# Patient Record
Sex: MALE | Race: WHITE | NOT HISPANIC OR LATINO | Employment: UNEMPLOYED | URBAN - NONMETROPOLITAN AREA
[De-identification: names, ages, dates, MRNs, and addresses within clinical notes are randomized per-mention and may not be internally consistent; named-entity substitution may affect disease eponyms.]

---

## 2020-01-24 ENCOUNTER — OFFICE VISIT (OUTPATIENT)
Dept: FAMILY MEDICINE CLINIC | Facility: CLINIC | Age: 60
End: 2020-01-24

## 2020-01-24 VITALS
HEART RATE: 113 BPM | BODY MASS INDEX: 29.49 KG/M2 | OXYGEN SATURATION: 95 % | TEMPERATURE: 98.7 F | HEIGHT: 70 IN | SYSTOLIC BLOOD PRESSURE: 130 MMHG | WEIGHT: 206 LBS | DIASTOLIC BLOOD PRESSURE: 78 MMHG

## 2020-01-24 DIAGNOSIS — Z72.0 TOBACCO USE: ICD-10-CM

## 2020-01-24 DIAGNOSIS — J44.9 CHRONIC OBSTRUCTIVE PULMONARY DISEASE, UNSPECIFIED COPD TYPE (HCC): Primary | ICD-10-CM

## 2020-01-24 DIAGNOSIS — R05.9 COUGH: ICD-10-CM

## 2020-01-24 PROCEDURE — 99203 OFFICE O/P NEW LOW 30 MIN: CPT | Performed by: NURSE PRACTITIONER

## 2020-01-24 RX ORDER — PREDNISONE 10 MG/1
10 TABLET ORAL DAILY
COMMUNITY
Start: 2020-01-15 | End: 2020-02-06 | Stop reason: HOSPADM

## 2020-01-24 RX ORDER — BUPROPION HYDROCHLORIDE 150 MG/1
150 TABLET, EXTENDED RELEASE ORAL 2 TIMES DAILY
Qty: 60 TABLET | Refills: 5 | Status: SHIPPED | OUTPATIENT
Start: 2020-01-24 | End: 2020-07-21

## 2020-01-24 RX ORDER — BENZONATATE 200 MG/1
200 CAPSULE ORAL 3 TIMES DAILY PRN
Qty: 60 CAPSULE | Refills: 1 | Status: SHIPPED | OUTPATIENT
Start: 2020-01-24 | End: 2020-04-28

## 2020-01-24 RX ORDER — LEVALBUTEROL TARTRATE 45 UG/1
AEROSOL, METERED ORAL
COMMUNITY
Start: 2020-01-15 | End: 2020-02-04

## 2020-01-24 RX ORDER — IPRATROPIUM BROMIDE AND ALBUTEROL SULFATE 2.5; .5 MG/3ML; MG/3ML
3 SOLUTION RESPIRATORY (INHALATION) 4 TIMES DAILY PRN
COMMUNITY
Start: 2020-01-15

## 2020-01-24 RX ORDER — MONTELUKAST SODIUM 10 MG/1
10 TABLET ORAL NIGHTLY
COMMUNITY
Start: 2020-01-15

## 2020-01-24 RX ORDER — OMALIZUMAB 202.5 MG/1.4ML
150 INJECTION, SOLUTION SUBCUTANEOUS
COMMUNITY
Start: 2020-01-31 | End: 2020-02-13

## 2020-01-24 NOTE — PROGRESS NOTES
Subjective   Vincenzo Fagan is a 59 y.o. male.     Chief Complaint: Establish Care and Cough    COPD   He complains of cough and shortness of breath. This is a chronic problem. The current episode started more than 1 year ago. The problem occurs constantly. The problem has been unchanged. Associated symptoms comments: Pt currently following with rubio Valente, in [place].. His symptoms are aggravated by exposure to smoke. Relieved by: Breo, Incruse, Singulair, Xopenex, DuoNeb. He reports moderate improvement on treatment. Risk factors for lung disease include smoking/tobacco exposure. His past medical history is significant for COPD.   Nicotine Dependence   Presents for initial visit. Symptoms include cravings. Preferred tobacco types include cigarettes. Preferred cigarette types include filtered. Preferred strength is regular. His urge triggers include company of smokers. His first smoke is from 8 to 10 AM. He smokes 1 pack of cigarettes per day. He started smoking when he was 15-17 years old. Treatments tried: chantix. Compliance with prior treatments has been good. There is no history of alcohol abuse and drug use.      Pt is here today to establish care.  Patient has not followed with a PCP in several years.  He has only been following with pulmonology.   Pt has a hx of COPD.  He is a current smoker.     Family History   Problem Relation Age of Onset   • COPD Mother    • Diabetes Mother    • Lung cancer Father        Social History     Socioeconomic History   • Marital status:      Spouse name: Not on file   • Number of children: Not on file   • Years of education: Not on file   • Highest education level: Not on file   Tobacco Use   • Smoking status: Current Some Day Smoker     Types: Cigarettes   • Smokeless tobacco: Never Used   Substance and Sexual Activity   • Alcohol use: Yes     Alcohol/week: 2.0 standard drinks     Types: 2 Cans of beer per week     Comment: Social/ with a meal   "  • Drug use: Never   • Sexual activity: Defer       Past Medical History:   Diagnosis Date   • Asthma    • COPD (chronic obstructive pulmonary disease) (CMS/[facility])    • Multiple allergies        Review of Systems   Constitutional: Negative.    HENT: Negative.    Respiratory: Positive for cough and shortness of breath.    Cardiovascular: Negative.    Gastrointestinal: Negative.    Musculoskeletal: Negative.    Skin: Negative.    Neurological: Negative.    Psychiatric/Behavioral: Negative.        Objective   Physical Exam   Constitutional: He is oriented to person, place, and time. He appears well-developed and well-nourished.   Neck: Normal range of motion. Neck supple.   Cardiovascular: Normal rate, regular rhythm and normal heart sounds.   Pulmonary/Chest: Effort normal. He has wheezes.   Neurological: He is alert and oriented to person, place, and time.   Skin: Skin is warm and dry.   Psychiatric: He has a normal mood and affect. His behavior is normal. Judgment and thought content normal.   Nursing note and vitals reviewed.      Procedures    Vitals: Blood pressure 130/78, pulse 113, temperature 98.7 °F (37.1 °C), temperature source Oral, height 177.8 cm (70\"), weight 93.4 kg (206 lb), SpO2 95 %.    Allergies: No Known Allergies     During this visit the following were done:  Labs Reviewed []    Labs Ordered []    Radiology Reports Reviewed []    Radiology Ordered []    PCP Records Reviewed []    Referring Provider Records Reviewed []    ER Records Reviewed []    Hospital Records Reviewed []    History Obtained From Family []    Radiology Images Reviewed []    Other Reviewed []    Records Requested []      Assessment/Plan   Vincenzo was seen today for establish care and cough.    Diagnoses and all orders for this visit:    Chronic obstructive pulmonary disease, unspecified COPD type (CMS/[facility])  -     CBC & Differential; Future  -     Comprehensive Metabolic Panel; Future  -     Lipid Panel; Future  -     Magnesium; " Future  -     TSH; Future  -     Vitamin B12; Future  -     T4, Free; Future  -     benzonatate (TESSALON) 200 MG capsule; Take 1 capsule by mouth 3 (Three) Times a Day As Needed for Cough.    Tobacco use  -     Start buPROPion SR (WELLBUTRIN SR) 150 MG 12 hr tablet; Take 1 tablet by mouth 2 (Two) Times a Day.  -     CBC & Differential; Future  -     Comprehensive Metabolic Panel; Future  -     Lipid Panel; Future  -     Magnesium; Future  -     TSH; Future  -     Vitamin B12; Future  -     T4, Free; Future    Cough  -     Start benzonatate (TESSALON) 200 MG capsule; Take 1 capsule by mouth 3 (Three) Times a Day As Needed for Cough.

## 2020-01-24 NOTE — PATIENT INSTRUCTIONS
For more information:    Quit Now [place]  1-800-QUIT-NOW  https://[place].quitlogix.org/en-[place]/  Steps to Quit Smoking  Smoking tobacco can be harmful to your health and can affect almost every organ in your body. Smoking puts you, and those around you, at risk for developing many serious chronic diseases. Quitting smoking is difficult, but it is one of the best things that you can do for your health. It is never too late to quit.  What are the benefits of quitting smoking?  When you quit smoking, you lower your risk of developing serious diseases and conditions, such as:  · Lung cancer or lung disease, such as COPD.  · Heart disease.  · Stroke.  · Heart attack.  · Infertility.  · Osteoporosis and bone fractures.  Additionally, symptoms such as coughing, wheezing, and shortness of breath may get better when you quit. You may also find that you get sick less often because your body is stronger at fighting off colds and infections. If you are pregnant, quitting smoking can help to reduce your chances of having a baby of low birth weight.  How do I get ready to quit?  When you decide to quit smoking, create a plan to make sure that you are successful. Before you quit:  · Pick a date to quit. Set a date within the next two weeks to give you time to prepare.  · Write down the reasons why you are quitting. Keep this list in places where you will see it often, such as on your bathroom mirror or in your car or wallet.  · Identify the people, places, things, and activities that make you want to smoke (triggers) and avoid them. Make sure to take these actions:  ¨ Throw away all cigarettes at home, at work, and in your car.  ¨ Throw away smoking accessories, such as ashtrays and lighters.  ¨ Clean your car and make sure to empty the ashtray.  ¨ Clean your home, including curtains and carpets.  · Tell your family, friends, and coworkers that you are quitting. Support from your loved ones can make quitting easier.  · Talk with  your health care provider about your options for quitting smoking.  · Find out what treatment options are covered by your health insurance.  What strategies can I use to quit smoking?  Talk with your healthcare provider about different strategies to quit smoking. Some strategies include:  · Quitting smoking altogether instead of gradually lessening how much you smoke over a period of time. Research shows that quitting “cold turkey” is more successful than gradually quitting.  · Attending in-person counseling to help you build problem-solving skills. You are more likely to have success in quitting if you attend several counseling sessions. Even short sessions of 10 minutes can be effective.  · Finding resources and support systems that can help you to quit smoking and remain smoke-free after you quit. These resources are most helpful when you use them often. They can include:  ¨ Online chats with a counselor.  ¨ Telephone quitlines.  ¨ Printed self-help materials.  ¨ Support groups or group counseling.  ¨ Text messaging programs.  ¨ Mobile phone applications.  · Taking medicines to help you quit smoking. (If you are pregnant or breastfeeding, talk with your health care provider first.) Some medicines contain nicotine and some do not. Both types of medicines help with cravings, but the medicines that include nicotine help to relieve withdrawal symptoms. Your health care provider may recommend:  ¨ Nicotine patches, gum, or lozenges.  ¨ Nicotine inhalers or sprays.  ¨ Non-nicotine medicine that is taken by mouth.  Talk with your health care provider about combining strategies, such as taking medicines while you are also receiving in-person counseling. Using these two strategies together makes you more likely to succeed in quitting than if you used either strategy on its own.  If you are pregnant or breastfeeding, talk with your health care provider about finding counseling or other support strategies to quit smoking. Do  not take medicine to help you quit smoking unless told to do so by your health care provider.  What things can I do to make it easier to quit?  Quitting smoking might feel overwhelming at first, but there is a lot that you can do to make it easier. Take these important actions:  · Reach out to your family and friends and ask that they support and encourage you during this time. Call telephone quitlines, reach out to support groups, or work with a counselor for support.  · Ask people who smoke to avoid smoking around you.  · Avoid places that trigger you to smoke, such as bars, parties, or smoke-break areas at work.  · Spend time around people who do not smoke.  · Lessen stress in your life, because stress can be a smoking trigger for some people. To lessen stress, try:  ¨ Exercising regularly.  ¨ Deep-breathing exercises.  ¨ Yoga.  ¨ Meditating.  ¨ Performing a body scan. This involves closing your eyes, scanning your body from head to toe, and noticing which parts of your body are particularly tense. Purposefully relax the muscles in those areas.  · Download or purchase mobile phone or tablet apps (applications) that can help you stick to your quit plan by providing reminders, tips, and encouragement. There are many free apps, such as QuitGuide from the CDC (Centers for Disease Control and Prevention). You can find other support for quitting smoking (smoking cessation) through smokefree.gov and other websites.  How will I feel when I quit smoking?  Within the first 24 hours of quitting smoking, you may start to feel some withdrawal symptoms. These symptoms are usually most noticeable 2-3 days after quitting, but they usually do not last beyond 2-3 weeks. Changes or symptoms that you might experience include:  · Mood swings.  · Restlessness, anxiety, or irritation.  · Difficulty concentrating.  · Dizziness.  · Strong cravings for sugary foods in addition to nicotine.  · Mild weight  gain.  · Constipation.  · Nausea.  · Coughing or a sore throat.  · Changes in how your medicines work in your body.  · A depressed mood.  · Difficulty sleeping (insomnia).  After the first 2-3 weeks of quitting, you may start to notice more positive results, such as:  · Improved sense of smell and taste.  · Decreased coughing and sore throat.  · Slower heart rate.  · Lower blood pressure.  · Clearer skin.  · The ability to breathe more easily.  · Fewer sick days.  Quitting smoking is very challenging for most people. Do not get discouraged if you are not successful the first time. Some people need to make many attempts to quit before they achieve long-term success. Do your best to stick to your quit plan, and talk with your health care provider if you have any questions or concerns.  This information is not intended to replace advice given to you by your health care provider. Make sure you discuss any questions you have with your health care provider.  Document Released: 12/12/2002 Document Revised: 08/15/2017 Document Reviewed: 05/03/2016  [employer] Interactive Patient Education © 2017 [employer]      Chronic Obstructive Pulmonary Disease    Chronic obstructive pulmonary disease (COPD) is a long-term (chronic) condition that affects the lungs. COPD is a general term that can be used to describe many different lung problems that cause lung swelling (inflammation) and limit airflow, including chronic bronchitis and emphysema. If you have COPD, your lung function will probably never return to normal. In most cases, it gets worse over time. However, there are steps you can take to slow the progression of the disease and improve your quality of life.  What are the causes?  This condition may be caused by:  · Smoking. This is the most common cause.  · Certain genes passed down through families.  What increases the risk?  The following factors may make you more likely to develop this condition:  · Secondhand smoke from  cigarettes, pipes, or cigars.  · Exposure to chemicals and other irritants such as fumes and dust in the work environment.  · Chronic lung conditions or infections.  What are the signs or symptoms?  Symptoms of this condition include:  · Shortness of breath, especially during physical activity.  · Chronic cough with a large amount of thick mucus. Sometimes the cough may not have any mucus (dry cough).  · Wheezing.  · Rapid breaths.  · Gray or bluish discoloration (cyanosis) of the skin, especially in your fingers, toes, or lips.  · Feeling tired (fatigue).  · Weight loss.  · Chest tightness.  · Frequent infections.  · Episodes when breathing symptoms become much worse (exacerbations).  · Swelling in the ankles, feet, or legs. This may occur in later stages of the disease.  How is this diagnosed?  This condition is diagnosed based on:  · Your medical history.  · A physical exam.  You may also have tests, including:  · Lung (pulmonary) function tests. This may include a spirometry test, which measures your ability to exhale properly.  · Chest X-ray.  · CT scan.  · Blood tests.  How is this treated?  This condition may be treated with:  · Medicines. These may include inhaled rescue medicines to treat acute exacerbations as well as long-term, or maintenance, medicines to prevent flare-ups of COPD.  ? Bronchodilators help treat COPD by dilating the airways to allow increased airflow and make your breathing more comfortable.  ? Steroids can reduce airway inflammation and help prevent exacerbations.  · Smoking cessation. If you smoke, your health care provider may ask you to quit, and may also recommend therapy or replacement products to help you quit.  · Pulmonary rehabilitation. This may involve working with a team of health care providers and specialists, such as respiratory, occupational, and physical therapists.  · Exercise and physical activity. These are beneficial for nearly all people with COPD.  · Nutrition  therapy to gain weight, if you are underweight.  · Oxygen. Supplemental oxygen therapy is only helpful if you have a low oxygen level in your blood (hypoxemia).  · Lung surgery or transplant.  · Palliative care. This is to help people with COPD feel comfortable when treatment is no longer working.  Follow these instructions at home:  Medicines  · Take over-the-counter and prescription medicines (inhaled or pills) only as told by your health care provider.  · Talk to your health care provider before taking any cough or allergy medicines. You may need to avoid certain medicines that dry out your airways.  Lifestyle  · If you are a smoker, the most important thing that you can do is to stop smoking. Do not use any products that contain nicotine or tobacco, such as cigarettes and e-cigarettes. If you need help quitting, ask your health care provider. Continuing to smoke will cause the disease to progress faster.  · Avoid exposure to things that irritate your lungs, such as smoke, chemicals, and fumes.  · Stay active, but balance activity with periods of rest. Exercise and physical activity will help you maintain your ability to do things you want to do.  · Learn and use relaxation techniques to manage stress and to control your breathing.  · Get the right amount of sleep and get quality sleep. Most adults need 7 or more hours per night.  · Eat healthy foods. Eating smaller, more frequent meals and resting before meals may help you maintain your strength.  Controlled breathing  Learn and use controlled breathing techniques as directed by your health care provider. Controlled breathing techniques include:  · Pursed lip breathing. Start by breathing in (inhaling) through your nose for 1 second. Then, purse your lips as if you were going to whistle and breathe out (exhale) through the pursed lips for 2 seconds.  · Diaphragmatic breathing. Start by putting one hand on your abdomen just above your waist. Inhale slowly through  your nose. The hand on your abdomen should move out. Then purse your lips and exhale slowly. You should be able to feel the hand on your abdomen moving in as you exhale.  Controlled coughing  Learn and use controlled coughing to clear mucus from your lungs. Controlled coughing is a series of short, progressive coughs. The steps of controlled coughing are:  1. Lean your head slightly forward.  2. Breathe in deeply using diaphragmatic breathing.  3. Try to hold your breath for 3 seconds.  4. Keep your mouth slightly open while coughing twice.  5. Spit any mucus out into a tissue.  6. Rest and repeat the steps once or twice as needed.  General instructions  · Make sure you receive all the vaccines that your health care provider recommends, especially the pneumococcal and influenza vaccines. Preventing infection and hospitalization is very important when you have COPD.  · Use oxygen therapy and pulmonary rehabilitation if directed to by your health care provider. If you require home oxygen therapy, ask your health care provider whether you should purchase a pulse oximeter to measure your oxygen level at home.  · Work with your health care provider to develop a COPD action plan. This will help you know what steps to take if your condition gets worse.  · Keep other chronic health conditions under control as told by your health care provider.  · Avoid extreme temperature and humidity changes.  · Avoid contact with people who have an illness that spreads from person to person (is contagious), such as viral infections or pneumonia.  · Keep all follow-up visits as told by your health care provider. This is important.  Contact a health care provider if:  · You are coughing up more mucus than usual.  · There is a change in the color or thickness of your mucus.  · Your breathing is more labored than usual.  · Your breathing is faster than usual.  · You have difficulty sleeping.  · You need to use your rescue medicines or  inhalers more often than expected.  · You have trouble doing routine activities such as getting dressed or walking around the house.  Get help right away if:  · You have shortness of breath while you are resting.  · You have shortness of breath that prevents you from:  ? Being able to talk.  ? Performing your usual physical activities.  · You have chest pain lasting longer than 5 minutes.  · Your skin color is more blue (cyanotic) than usual.  · You measure low oxygen saturations for longer than 5 minutes with a pulse oximeter.  · You have a fever.  · You feel too tired to breathe normally.  Summary  · Chronic obstructive pulmonary disease (COPD) is a long-term (chronic) condition that affects the lungs.  · Your lung function will probably never return to normal. In most cases, it gets worse over time. However, there are steps you can take to slow the progression of the disease and improve your quality of life.  · Treatment for COPD may include taking medicines, quitting smoking, pulmonary rehabilitation, and changes to diet and exercise. As the disease progresses, you may need oxygen therapy, a lung transplant, or palliative care.  · To help manage your condition, do not smoke, avoid exposure to things that irritate your lungs, stay up to date on all vaccines, and follow your health care provider's instructions for taking medicines.  This information is not intended to replace advice given to you by your health care provider. Make sure you discuss any questions you have with your health care provider.  Document Released: 09/27/2006 Document Revised: 06/13/2018 Document Reviewed: 01/22/2018  [employer] Interactive Patient Education © 2019 [employer]

## 2020-01-28 ENCOUNTER — LAB (OUTPATIENT)
Dept: FAMILY MEDICINE CLINIC | Facility: CLINIC | Age: 60
End: 2020-01-28

## 2020-01-28 DIAGNOSIS — Z72.0 TOBACCO USE: ICD-10-CM

## 2020-01-28 DIAGNOSIS — J44.9 CHRONIC OBSTRUCTIVE PULMONARY DISEASE, UNSPECIFIED COPD TYPE (HCC): ICD-10-CM

## 2020-01-28 PROCEDURE — 85025 COMPLETE CBC W/AUTO DIFF WBC: CPT | Performed by: NURSE PRACTITIONER

## 2020-01-28 PROCEDURE — 82607 VITAMIN B-12: CPT | Performed by: NURSE PRACTITIONER

## 2020-01-28 PROCEDURE — 84439 ASSAY OF FREE THYROXINE: CPT | Performed by: NURSE PRACTITIONER

## 2020-01-28 PROCEDURE — 80053 COMPREHEN METABOLIC PANEL: CPT | Performed by: NURSE PRACTITIONER

## 2020-01-28 PROCEDURE — 80061 LIPID PANEL: CPT | Performed by: NURSE PRACTITIONER

## 2020-01-28 PROCEDURE — 83735 ASSAY OF MAGNESIUM: CPT | Performed by: NURSE PRACTITIONER

## 2020-01-28 PROCEDURE — 84443 ASSAY THYROID STIM HORMONE: CPT | Performed by: NURSE PRACTITIONER

## 2020-01-29 LAB
ALBUMIN SERPL-MCNC: 4.3 G/DL (ref 3.5–5.2)
ALBUMIN/GLOB SERPL: 1.7 G/DL
ALP SERPL-CCNC: 63 U/L (ref 39–117)
ALT SERPL W P-5'-P-CCNC: 23 U/L (ref 1–41)
ANION GAP SERPL CALCULATED.3IONS-SCNC: 15.1 MMOL/L (ref 5–15)
AST SERPL-CCNC: 15 U/L (ref 1–40)
BASOPHILS # BLD AUTO: 0.02 10*3/MM3 (ref 0–0.2)
BASOPHILS NFR BLD AUTO: 0.2 % (ref 0–1.5)
BILIRUB SERPL-MCNC: 0.3 MG/DL (ref 0.2–1.2)
BUN BLD-MCNC: 10 MG/DL (ref 6–20)
BUN/CREAT SERPL: 11.2 (ref 7–25)
CALCIUM SPEC-SCNC: 9.1 MG/DL (ref 8.6–10.5)
CHLORIDE SERPL-SCNC: 97 MMOL/L (ref 98–107)
CHOLEST SERPL-MCNC: 187 MG/DL (ref 0–200)
CO2 SERPL-SCNC: 23.9 MMOL/L (ref 22–29)
CREAT BLD-MCNC: 0.89 MG/DL (ref 0.76–1.27)
DEPRECATED RDW RBC AUTO: 39.9 FL (ref 37–54)
EOSINOPHIL # BLD AUTO: 0.1 10*3/MM3 (ref 0–0.4)
EOSINOPHIL NFR BLD AUTO: 1 % (ref 0.3–6.2)
ERYTHROCYTE [DISTWIDTH] IN BLOOD BY AUTOMATED COUNT: 12.4 % (ref 12.3–15.4)
GFR SERPL CREATININE-BSD FRML MDRD: 88 ML/MIN/1.73
GLOBULIN UR ELPH-MCNC: 2.5 GM/DL
GLUCOSE BLD-MCNC: 90 MG/DL (ref 65–99)
HCT VFR BLD AUTO: 47 % (ref 37.5–51)
HDLC SERPL-MCNC: 48 MG/DL (ref 40–60)
HGB BLD-MCNC: 15.3 G/DL (ref 13–17.7)
IMM GRANULOCYTES # BLD AUTO: 0.04 10*3/MM3 (ref 0–0.05)
IMM GRANULOCYTES NFR BLD AUTO: 0.4 % (ref 0–0.5)
LDLC SERPL CALC-MCNC: 121 MG/DL (ref 0–100)
LDLC/HDLC SERPL: 2.53 {RATIO}
LYMPHOCYTES # BLD AUTO: 1.57 10*3/MM3 (ref 0.7–3.1)
LYMPHOCYTES NFR BLD AUTO: 15.7 % (ref 19.6–45.3)
MAGNESIUM SERPL-MCNC: 2.2 MG/DL (ref 1.6–2.6)
MCH RBC QN AUTO: 28.8 PG (ref 26.6–33)
MCHC RBC AUTO-ENTMCNC: 32.6 G/DL (ref 31.5–35.7)
MCV RBC AUTO: 88.3 FL (ref 79–97)
MONOCYTES # BLD AUTO: 1 10*3/MM3 (ref 0.1–0.9)
MONOCYTES NFR BLD AUTO: 10 % (ref 5–12)
NEUTROPHILS # BLD AUTO: 7.29 10*3/MM3 (ref 1.7–7)
NEUTROPHILS NFR BLD AUTO: 72.7 % (ref 42.7–76)
NRBC BLD AUTO-RTO: 0 /100 WBC (ref 0–0.2)
PLATELET # BLD AUTO: 274 10*3/MM3 (ref 140–450)
PMV BLD AUTO: 10.9 FL (ref 6–12)
POTASSIUM BLD-SCNC: 4.1 MMOL/L (ref 3.5–5.2)
PROT SERPL-MCNC: 6.8 G/DL (ref 6–8.5)
RBC # BLD AUTO: 5.32 10*6/MM3 (ref 4.14–5.8)
SODIUM BLD-SCNC: 136 MMOL/L (ref 136–145)
T4 FREE SERPL-MCNC: 0.99 NG/DL (ref 0.93–1.7)
TRIGL SERPL-MCNC: 89 MG/DL (ref 0–150)
TSH SERPL DL<=0.05 MIU/L-ACNC: 0.73 UIU/ML (ref 0.27–4.2)
VIT B12 BLD-MCNC: 355 PG/ML (ref 211–946)
VLDLC SERPL-MCNC: 17.8 MG/DL (ref 5–40)
WBC NRBC COR # BLD: 10.02 10*3/MM3 (ref 3.4–10.8)

## 2020-01-30 RX ORDER — ATORVASTATIN CALCIUM 20 MG/1
20 TABLET, FILM COATED ORAL NIGHTLY
Qty: 90 TABLET | Refills: 1 | Status: SHIPPED | OUTPATIENT
Start: 2020-01-30 | End: 2020-10-13

## 2020-02-03 ENCOUNTER — APPOINTMENT (OUTPATIENT)
Dept: CT IMAGING | Facility: HOSPITAL | Age: 60
End: 2020-02-03

## 2020-02-03 ENCOUNTER — APPOINTMENT (OUTPATIENT)
Dept: GENERAL RADIOLOGY | Facility: HOSPITAL | Age: 60
End: 2020-02-03

## 2020-02-03 ENCOUNTER — HOSPITAL ENCOUNTER (INPATIENT)
Facility: HOSPITAL | Age: 60
LOS: 3 days | Discharge: HOME | End: 2020-02-06
Attending: FAMILY MEDICINE | Admitting: HOSPITALIST

## 2020-02-03 ENCOUNTER — DOCUMENTATION (OUTPATIENT)
Dept: FAMILY MEDICINE CLINIC | Facility: CLINIC | Age: 60
End: 2020-02-03

## 2020-02-03 DIAGNOSIS — J10.1 INFLUENZA A: Primary | ICD-10-CM

## 2020-02-03 DIAGNOSIS — R09.02 HYPOXIA: ICD-10-CM

## 2020-02-03 PROBLEM — J96.01 ACUTE RESPIRATORY FAILURE WITH HYPOXIA (HCC): Status: ACTIVE | Noted: 2020-02-03

## 2020-02-03 LAB
A-A DO2: 32.8 MMHG (ref 0–300)
A-A DO2: 59.9 MMHG (ref 0–300)
ALBUMIN SERPL-MCNC: 4.34 G/DL (ref 3.5–5.2)
ALBUMIN/GLOB SERPL: 1.2 G/DL
ALP SERPL-CCNC: 77 U/L (ref 39–117)
ALT SERPL W P-5'-P-CCNC: 20 U/L (ref 1–41)
ANION GAP SERPL CALCULATED.3IONS-SCNC: 14.8 MMOL/L (ref 5–15)
APTT PPP: 32.3 SECONDS (ref 23.8–36.1)
ARTERIAL PATENCY WRIST A: POSITIVE
ARTERIAL PATENCY WRIST A: POSITIVE
AST SERPL-CCNC: 16 U/L (ref 1–40)
ATMOSPHERIC PRESS: 724 MMHG
ATMOSPHERIC PRESS: 725 MMHG
BASE EXCESS BLDA CALC-SCNC: 0 MMOL/L (ref 0–2)
BASE EXCESS BLDA CALC-SCNC: 1.4 MMOL/L (ref 0–2)
BASOPHILS # BLD AUTO: 0.02 10*3/MM3 (ref 0–0.2)
BASOPHILS NFR BLD AUTO: 0.2 % (ref 0–1.5)
BDY SITE: ABNORMAL
BDY SITE: ABNORMAL
BILIRUB SERPL-MCNC: 0.3 MG/DL (ref 0.2–1.2)
BODY TEMPERATURE: 0 C
BODY TEMPERATURE: 0 C
BUN BLD-MCNC: 11 MG/DL (ref 6–20)
BUN/CREAT SERPL: 9.6 (ref 7–25)
CALCIUM SPEC-SCNC: 9.7 MG/DL (ref 8.6–10.5)
CHLORIDE SERPL-SCNC: 98 MMOL/L (ref 98–107)
CO2 BLDA-SCNC: 27.4 MMOL/L (ref 22–33)
CO2 BLDA-SCNC: 27.5 MMOL/L (ref 22–33)
CO2 SERPL-SCNC: 23.2 MMOL/L (ref 22–29)
COHGB MFR BLD: 0.9 % (ref 0–5)
COHGB MFR BLD: 0.9 % (ref 0–5)
CREAT BLD-MCNC: 1.15 MG/DL (ref 0.76–1.27)
CRP SERPL-MCNC: 1.55 MG/DL (ref 0–0.5)
D-LACTATE SERPL-SCNC: 2.5 MMOL/L (ref 0.5–2)
DEPRECATED RDW RBC AUTO: 43.1 FL (ref 37–54)
EOSINOPHIL # BLD AUTO: 0 10*3/MM3 (ref 0–0.4)
EOSINOPHIL NFR BLD AUTO: 0 % (ref 0.3–6.2)
ERYTHROCYTE [DISTWIDTH] IN BLOOD BY AUTOMATED COUNT: 12.8 % (ref 12.3–15.4)
FLUAV AG NPH QL: POSITIVE
FLUBV AG NPH QL IA: NEGATIVE
GAS FLOW AIRWAY: 1 LPM
GFR SERPL CREATININE-BSD FRML MDRD: 65 ML/MIN/1.73
GLOBULIN UR ELPH-MCNC: 3.6 GM/DL
GLUCOSE BLD-MCNC: 131 MG/DL (ref 65–99)
HCO3 BLDA-SCNC: 26 MMOL/L (ref 20–26)
HCO3 BLDA-SCNC: 26.3 MMOL/L (ref 20–26)
HCT VFR BLD AUTO: 49.1 % (ref 37.5–51)
HCT VFR BLD CALC: 45.9 % (ref 38–51)
HCT VFR BLD CALC: 48.5 % (ref 38–51)
HGB BLD-MCNC: 15.5 G/DL (ref 13–17.7)
HGB BLDA-MCNC: 15 G/DL (ref 14–18)
HGB BLDA-MCNC: 15.8 G/DL (ref 14–18)
HOLD SPECIMEN: NORMAL
HOROWITZ INDEX BLD+IHG-RTO: 21 %
HOROWITZ INDEX BLD+IHG-RTO: 24 %
IMM GRANULOCYTES # BLD AUTO: 0.07 10*3/MM3 (ref 0–0.05)
IMM GRANULOCYTES NFR BLD AUTO: 0.6 % (ref 0–0.5)
INR PPP: 0.94 (ref 0.9–1.1)
LYMPHOCYTES # BLD AUTO: 0.56 10*3/MM3 (ref 0.7–3.1)
LYMPHOCYTES NFR BLD AUTO: 4.4 % (ref 19.6–45.3)
Lab: ABNORMAL
Lab: ABNORMAL
MAGNESIUM SERPL-MCNC: 2 MG/DL (ref 1.6–2.6)
MCH RBC QN AUTO: 28.9 PG (ref 26.6–33)
MCHC RBC AUTO-ENTMCNC: 31.6 G/DL (ref 31.5–35.7)
MCV RBC AUTO: 91.4 FL (ref 79–97)
METHGB BLD QL: 0.4 % (ref 0–3)
METHGB BLD QL: 0.4 % (ref 0–3)
MODALITY: ABNORMAL
MODALITY: ABNORMAL
MONOCYTES # BLD AUTO: 1.09 10*3/MM3 (ref 0.1–0.9)
MONOCYTES NFR BLD AUTO: 8.6 % (ref 5–12)
NEUTROPHILS # BLD AUTO: 10.87 10*3/MM3 (ref 1.7–7)
NEUTROPHILS NFR BLD AUTO: 86.2 % (ref 42.7–76)
NOTE: ABNORMAL
NOTE: ABNORMAL
NRBC BLD AUTO-RTO: 0 /100 WBC (ref 0–0.2)
NT-PROBNP SERPL-MCNC: 119.3 PG/ML (ref 5–900)
OXYHGB MFR BLDV: 87.8 % (ref 94–99)
OXYHGB MFR BLDV: 88.7 % (ref 94–99)
PCO2 BLDA: 41.6 MM HG (ref 35–45)
PCO2 BLDA: 46.2 MM HG (ref 35–45)
PCO2 TEMP ADJ BLD: ABNORMAL MM[HG]
PCO2 TEMP ADJ BLD: ABNORMAL MM[HG]
PH BLDA: 7.36 PH UNITS (ref 7.35–7.45)
PH BLDA: 7.41 PH UNITS (ref 7.35–7.45)
PH, TEMP CORRECTED: ABNORMAL
PH, TEMP CORRECTED: ABNORMAL
PLATELET # BLD AUTO: 294 10*3/MM3 (ref 140–450)
PMV BLD AUTO: 9.9 FL (ref 6–12)
PO2 BLDA: 56 MM HG (ref 83–108)
PO2 BLDA: 57.4 MM HG (ref 83–108)
PO2 TEMP ADJ BLD: ABNORMAL MM[HG]
PO2 TEMP ADJ BLD: ABNORMAL MM[HG]
POTASSIUM BLD-SCNC: 4.3 MMOL/L (ref 3.5–5.2)
PROT SERPL-MCNC: 7.9 G/DL (ref 6–8.5)
PROTHROMBIN TIME: 13.1 SECONDS (ref 11–15.4)
RBC # BLD AUTO: 5.37 10*6/MM3 (ref 4.14–5.8)
SAO2 % BLDCOA: 89 % (ref 94–99)
SAO2 % BLDCOA: 89.9 % (ref 94–99)
SODIUM BLD-SCNC: 136 MMOL/L (ref 136–145)
TROPONIN T SERPL-MCNC: <0.01 NG/ML (ref 0–0.03)
VENTILATOR MODE: ABNORMAL
VENTILATOR MODE: ABNORMAL
WBC NRBC COR # BLD: 12.61 10*3/MM3 (ref 3.4–10.8)
WHOLE BLOOD HOLD SPECIMEN: NORMAL
WHOLE BLOOD HOLD SPECIMEN: NORMAL

## 2020-02-03 PROCEDURE — 99284 EMERGENCY DEPT VISIT MOD MDM: CPT

## 2020-02-03 PROCEDURE — 86140 C-REACTIVE PROTEIN: CPT | Performed by: PHYSICIAN ASSISTANT

## 2020-02-03 PROCEDURE — 85730 THROMBOPLASTIN TIME PARTIAL: CPT | Performed by: PHYSICIAN ASSISTANT

## 2020-02-03 PROCEDURE — 87804 INFLUENZA ASSAY W/OPTIC: CPT | Performed by: PHYSICIAN ASSISTANT

## 2020-02-03 PROCEDURE — 85025 COMPLETE CBC W/AUTO DIFF WBC: CPT | Performed by: FAMILY MEDICINE

## 2020-02-03 PROCEDURE — 0 IOPAMIDOL PER 1 ML: Performed by: FAMILY MEDICINE

## 2020-02-03 PROCEDURE — 82805 BLOOD GASES W/O2 SATURATION: CPT

## 2020-02-03 PROCEDURE — 93005 ELECTROCARDIOGRAM TRACING: CPT | Performed by: FAMILY MEDICINE

## 2020-02-03 PROCEDURE — 93010 ELECTROCARDIOGRAM REPORT: CPT | Performed by: INTERNAL MEDICINE

## 2020-02-03 PROCEDURE — 71275 CT ANGIOGRAPHY CHEST: CPT

## 2020-02-03 PROCEDURE — 83735 ASSAY OF MAGNESIUM: CPT | Performed by: PHYSICIAN ASSISTANT

## 2020-02-03 PROCEDURE — 36600 WITHDRAWAL OF ARTERIAL BLOOD: CPT

## 2020-02-03 PROCEDURE — 84484 ASSAY OF TROPONIN QUANT: CPT | Performed by: PHYSICIAN ASSISTANT

## 2020-02-03 PROCEDURE — 94799 UNLISTED PULMONARY SVC/PX: CPT

## 2020-02-03 PROCEDURE — 94640 AIRWAY INHALATION TREATMENT: CPT

## 2020-02-03 PROCEDURE — 80053 COMPREHEN METABOLIC PANEL: CPT | Performed by: FAMILY MEDICINE

## 2020-02-03 PROCEDURE — 87040 BLOOD CULTURE FOR BACTERIA: CPT | Performed by: FAMILY MEDICINE

## 2020-02-03 PROCEDURE — 71045 X-RAY EXAM CHEST 1 VIEW: CPT | Performed by: RADIOLOGY

## 2020-02-03 PROCEDURE — 83050 HGB METHEMOGLOBIN QUAN: CPT

## 2020-02-03 PROCEDURE — 71045 X-RAY EXAM CHEST 1 VIEW: CPT

## 2020-02-03 PROCEDURE — 83605 ASSAY OF LACTIC ACID: CPT | Performed by: FAMILY MEDICINE

## 2020-02-03 PROCEDURE — 83880 ASSAY OF NATRIURETIC PEPTIDE: CPT | Performed by: PHYSICIAN ASSISTANT

## 2020-02-03 PROCEDURE — 25010000002 METHYLPREDNISOLONE PER 125 MG: Performed by: PHYSICIAN ASSISTANT

## 2020-02-03 PROCEDURE — 82375 ASSAY CARBOXYHB QUANT: CPT

## 2020-02-03 PROCEDURE — 85610 PROTHROMBIN TIME: CPT | Performed by: PHYSICIAN ASSISTANT

## 2020-02-03 RX ORDER — LEVALBUTEROL INHALATION SOLUTION 1.25 MG/3ML
1.25 SOLUTION RESPIRATORY (INHALATION) EVERY 6 HOURS PRN
Status: DISCONTINUED | OUTPATIENT
Start: 2020-02-03 | End: 2020-02-06 | Stop reason: HOSPADM

## 2020-02-03 RX ORDER — ACETAMINOPHEN 325 MG/1
650 TABLET ORAL ONCE
Status: COMPLETED | OUTPATIENT
Start: 2020-02-03 | End: 2020-02-03

## 2020-02-03 RX ORDER — SODIUM CHLORIDE 0.9 % (FLUSH) 0.9 %
10 SYRINGE (ML) INJECTION AS NEEDED
Status: DISCONTINUED | OUTPATIENT
Start: 2020-02-03 | End: 2020-02-06 | Stop reason: HOSPADM

## 2020-02-03 RX ORDER — METHYLPREDNISOLONE SODIUM SUCCINATE 125 MG/2ML
125 INJECTION, POWDER, LYOPHILIZED, FOR SOLUTION INTRAMUSCULAR; INTRAVENOUS ONCE
Status: COMPLETED | OUTPATIENT
Start: 2020-02-03 | End: 2020-02-03

## 2020-02-03 RX ORDER — OSELTAMIVIR PHOSPHATE 75 MG/1
75 CAPSULE ORAL ONCE
Status: COMPLETED | OUTPATIENT
Start: 2020-02-03 | End: 2020-02-03

## 2020-02-03 RX ORDER — IPRATROPIUM BROMIDE AND ALBUTEROL SULFATE 2.5; .5 MG/3ML; MG/3ML
3 SOLUTION RESPIRATORY (INHALATION) ONCE
Status: COMPLETED | OUTPATIENT
Start: 2020-02-03 | End: 2020-02-03

## 2020-02-03 RX ADMIN — SODIUM CHLORIDE 1000 ML: 9 INJECTION, SOLUTION INTRAVENOUS at 21:43

## 2020-02-03 RX ADMIN — IOPAMIDOL 95 ML: 755 INJECTION, SOLUTION INTRAVENOUS at 22:31

## 2020-02-03 RX ADMIN — IPRATROPIUM BROMIDE 0.5 MG: 0.5 SOLUTION RESPIRATORY (INHALATION) at 23:46

## 2020-02-03 RX ADMIN — IPRATROPIUM BROMIDE AND ALBUTEROL SULFATE 3 ML: .5; 3 SOLUTION RESPIRATORY (INHALATION) at 19:19

## 2020-02-03 RX ADMIN — METHYLPREDNISOLONE SODIUM SUCCINATE 125 MG: 125 INJECTION, POWDER, FOR SOLUTION INTRAMUSCULAR; INTRAVENOUS at 19:41

## 2020-02-03 RX ADMIN — HYDROCODONE POLISTIREX AND CHLORPHENIRAMINE POLISTIREX 5 ML: 10; 8 SUSPENSION, EXTENDED RELEASE ORAL at 19:41

## 2020-02-03 RX ADMIN — SODIUM CHLORIDE 1000 ML: 9 INJECTION, SOLUTION INTRAVENOUS at 19:41

## 2020-02-03 RX ADMIN — SODIUM CHLORIDE 1000 ML: 9 INJECTION, SOLUTION INTRAVENOUS at 19:39

## 2020-02-03 RX ADMIN — OSELTAMIVIR PHOSPHATE 75 MG: 75 CAPSULE ORAL at 20:11

## 2020-02-03 RX ADMIN — ACETAMINOPHEN 650 MG: 325 TABLET ORAL at 21:43

## 2020-02-03 RX ADMIN — LEVALBUTEROL HYDROCHLORIDE 1.25 MG: 1.25 SOLUTION RESPIRATORY (INHALATION) at 20:25

## 2020-02-03 NOTE — PROGRESS NOTES
Patient's wife called call line - states that patient has been having a several day history of shortness of breath, coughing, malaise, fatigue, and fever. It is getting so bad now that he cannot talk. Discussed with patient to go to the ER. He will go - MA is giving report to the ER.

## 2020-02-04 LAB
ALBUMIN SERPL-MCNC: 3.8 G/DL (ref 3.5–5.2)
ALBUMIN/GLOB SERPL: 1.2 G/DL
ALP SERPL-CCNC: 67 U/L (ref 39–117)
ALT SERPL W P-5'-P-CCNC: 20 U/L (ref 1–41)
ANION GAP SERPL CALCULATED.3IONS-SCNC: 10.1 MMOL/L (ref 5–15)
AST SERPL-CCNC: 14 U/L (ref 1–40)
BASOPHILS # BLD AUTO: 0.01 10*3/MM3 (ref 0–0.2)
BASOPHILS NFR BLD AUTO: 0.1 % (ref 0–1.5)
BILIRUB SERPL-MCNC: 0.3 MG/DL (ref 0.2–1.2)
BILIRUB UR QL STRIP: NEGATIVE
BUN BLD-MCNC: 11 MG/DL (ref 6–20)
BUN/CREAT SERPL: 11.1 (ref 7–25)
CALCIUM SPEC-SCNC: 8.8 MG/DL (ref 8.6–10.5)
CHLORIDE SERPL-SCNC: 101 MMOL/L (ref 98–107)
CLARITY UR: CLEAR
CO2 SERPL-SCNC: 22.9 MMOL/L (ref 22–29)
COLOR UR: YELLOW
CREAT BLD-MCNC: 0.99 MG/DL (ref 0.76–1.27)
CRP SERPL-MCNC: 2.15 MG/DL (ref 0–0.5)
D-LACTATE SERPL-SCNC: 1.9 MMOL/L (ref 0.5–2)
DEPRECATED RDW RBC AUTO: 43.9 FL (ref 37–54)
EOSINOPHIL # BLD AUTO: 0 10*3/MM3 (ref 0–0.4)
EOSINOPHIL NFR BLD AUTO: 0 % (ref 0.3–6.2)
ERYTHROCYTE [DISTWIDTH] IN BLOOD BY AUTOMATED COUNT: 13 % (ref 12.3–15.4)
GFR SERPL CREATININE-BSD FRML MDRD: 78 ML/MIN/1.73
GLOBULIN UR ELPH-MCNC: 3.1 GM/DL
GLUCOSE BLD-MCNC: 143 MG/DL (ref 65–99)
GLUCOSE UR STRIP-MCNC: NEGATIVE MG/DL
HCT VFR BLD AUTO: 45.6 % (ref 37.5–51)
HGB BLD-MCNC: 14.2 G/DL (ref 13–17.7)
HGB UR QL STRIP.AUTO: NEGATIVE
IMM GRANULOCYTES # BLD AUTO: 0.05 10*3/MM3 (ref 0–0.05)
IMM GRANULOCYTES NFR BLD AUTO: 0.5 % (ref 0–0.5)
KETONES UR QL STRIP: NEGATIVE
LEUKOCYTE ESTERASE UR QL STRIP.AUTO: NEGATIVE
LYMPHOCYTES # BLD AUTO: 0.31 10*3/MM3 (ref 0.7–3.1)
LYMPHOCYTES NFR BLD AUTO: 3.1 % (ref 19.6–45.3)
MCH RBC QN AUTO: 28.7 PG (ref 26.6–33)
MCHC RBC AUTO-ENTMCNC: 31.1 G/DL (ref 31.5–35.7)
MCV RBC AUTO: 92.3 FL (ref 79–97)
MONOCYTES # BLD AUTO: 0.12 10*3/MM3 (ref 0.1–0.9)
MONOCYTES NFR BLD AUTO: 1.2 % (ref 5–12)
NEUTROPHILS # BLD AUTO: 9.66 10*3/MM3 (ref 1.7–7)
NEUTROPHILS NFR BLD AUTO: 95.1 % (ref 42.7–76)
NITRITE UR QL STRIP: NEGATIVE
NRBC BLD AUTO-RTO: 0 /100 WBC (ref 0–0.2)
PH UR STRIP.AUTO: 6 [PH] (ref 5–8)
PLATELET # BLD AUTO: 249 10*3/MM3 (ref 140–450)
PMV BLD AUTO: 10.1 FL (ref 6–12)
POTASSIUM BLD-SCNC: 4.2 MMOL/L (ref 3.5–5.2)
PROT SERPL-MCNC: 6.9 G/DL (ref 6–8.5)
PROT UR QL STRIP: NEGATIVE
RBC # BLD AUTO: 4.94 10*6/MM3 (ref 4.14–5.8)
SODIUM BLD-SCNC: 134 MMOL/L (ref 136–145)
SP GR UR STRIP: 1.02 (ref 1–1.03)
UROBILINOGEN UR QL STRIP: NORMAL
WBC NRBC COR # BLD: 10.15 10*3/MM3 (ref 3.4–10.8)

## 2020-02-04 PROCEDURE — 80053 COMPREHEN METABOLIC PANEL: CPT | Performed by: HOSPITALIST

## 2020-02-04 PROCEDURE — 86140 C-REACTIVE PROTEIN: CPT | Performed by: HOSPITALIST

## 2020-02-04 PROCEDURE — 85025 COMPLETE CBC W/AUTO DIFF WBC: CPT | Performed by: HOSPITALIST

## 2020-02-04 PROCEDURE — 81003 URINALYSIS AUTO W/O SCOPE: CPT | Performed by: HOSPITALIST

## 2020-02-04 PROCEDURE — 94799 UNLISTED PULMONARY SVC/PX: CPT

## 2020-02-04 PROCEDURE — 25010000002 HEPARIN (PORCINE) PER 1000 UNITS: Performed by: HOSPITALIST

## 2020-02-04 PROCEDURE — 94640 AIRWAY INHALATION TREATMENT: CPT

## 2020-02-04 PROCEDURE — 99223 1ST HOSP IP/OBS HIGH 75: CPT | Performed by: HOSPITALIST

## 2020-02-04 PROCEDURE — 83605 ASSAY OF LACTIC ACID: CPT | Performed by: FAMILY MEDICINE

## 2020-02-04 PROCEDURE — 25010000002 METHYLPREDNISOLONE PER 40 MG: Performed by: HOSPITALIST

## 2020-02-04 RX ORDER — BENZONATATE 100 MG/1
200 CAPSULE ORAL 3 TIMES DAILY PRN
Status: DISCONTINUED | OUTPATIENT
Start: 2020-02-04 | End: 2020-02-06 | Stop reason: HOSPADM

## 2020-02-04 RX ORDER — ALBUTEROL SULFATE 90 UG/1
2 AEROSOL, METERED RESPIRATORY (INHALATION) EVERY 4 HOURS PRN
COMMUNITY

## 2020-02-04 RX ORDER — ATORVASTATIN CALCIUM 20 MG/1
20 TABLET, FILM COATED ORAL NIGHTLY
Status: DISCONTINUED | OUTPATIENT
Start: 2020-02-04 | End: 2020-02-06 | Stop reason: HOSPADM

## 2020-02-04 RX ORDER — IPRATROPIUM BROMIDE AND ALBUTEROL SULFATE 2.5; .5 MG/3ML; MG/3ML
3 SOLUTION RESPIRATORY (INHALATION) 4 TIMES DAILY PRN
Status: DISCONTINUED | OUTPATIENT
Start: 2020-02-04 | End: 2020-02-06 | Stop reason: HOSPADM

## 2020-02-04 RX ORDER — BUDESONIDE AND FORMOTEROL FUMARATE DIHYDRATE 160; 4.5 UG/1; UG/1
2 AEROSOL RESPIRATORY (INHALATION)
Status: DISCONTINUED | OUTPATIENT
Start: 2020-02-04 | End: 2020-02-06 | Stop reason: HOSPADM

## 2020-02-04 RX ORDER — L.ACID,PARA/B.BIFIDUM/S.THERM 8B CELL
1 CAPSULE ORAL DAILY
Status: DISCONTINUED | OUTPATIENT
Start: 2020-02-04 | End: 2020-02-06 | Stop reason: HOSPADM

## 2020-02-04 RX ORDER — ALBUTEROL SULFATE 2.5 MG/3ML
2.5 SOLUTION RESPIRATORY (INHALATION) EVERY 4 HOURS PRN
Status: CANCELLED | OUTPATIENT
Start: 2020-02-04

## 2020-02-04 RX ORDER — SODIUM CHLORIDE 0.9 % (FLUSH) 0.9 %
10 SYRINGE (ML) INJECTION EVERY 12 HOURS SCHEDULED
Status: DISCONTINUED | OUTPATIENT
Start: 2020-02-04 | End: 2020-02-06 | Stop reason: HOSPADM

## 2020-02-04 RX ORDER — OSELTAMIVIR PHOSPHATE 75 MG/1
75 CAPSULE ORAL EVERY 12 HOURS SCHEDULED
Status: DISCONTINUED | OUTPATIENT
Start: 2020-02-04 | End: 2020-02-06 | Stop reason: HOSPADM

## 2020-02-04 RX ORDER — MONTELUKAST SODIUM 10 MG/1
10 TABLET ORAL NIGHTLY
Status: DISCONTINUED | OUTPATIENT
Start: 2020-02-04 | End: 2020-02-06 | Stop reason: HOSPADM

## 2020-02-04 RX ORDER — BUPROPION HYDROCHLORIDE 150 MG/1
150 TABLET, EXTENDED RELEASE ORAL 2 TIMES DAILY
Status: DISCONTINUED | OUTPATIENT
Start: 2020-02-04 | End: 2020-02-06 | Stop reason: HOSPADM

## 2020-02-04 RX ORDER — SODIUM CHLORIDE 9 MG/ML
100 INJECTION, SOLUTION INTRAVENOUS CONTINUOUS
Status: DISCONTINUED | OUTPATIENT
Start: 2020-02-04 | End: 2020-02-04

## 2020-02-04 RX ORDER — HEPARIN SODIUM 5000 [USP'U]/ML
5000 INJECTION, SOLUTION INTRAVENOUS; SUBCUTANEOUS EVERY 12 HOURS SCHEDULED
Status: DISCONTINUED | OUTPATIENT
Start: 2020-02-04 | End: 2020-02-06 | Stop reason: HOSPADM

## 2020-02-04 RX ORDER — SODIUM CHLORIDE 0.9 % (FLUSH) 0.9 %
10 SYRINGE (ML) INJECTION AS NEEDED
Status: DISCONTINUED | OUTPATIENT
Start: 2020-02-04 | End: 2020-02-06 | Stop reason: HOSPADM

## 2020-02-04 RX ORDER — METHYLPREDNISOLONE SODIUM SUCCINATE 40 MG/ML
40 INJECTION, POWDER, LYOPHILIZED, FOR SOLUTION INTRAMUSCULAR; INTRAVENOUS EVERY 12 HOURS
Status: DISCONTINUED | OUTPATIENT
Start: 2020-02-04 | End: 2020-02-06

## 2020-02-04 RX ORDER — ACETYLCYSTEINE 200 MG/ML
3 SOLUTION ORAL; RESPIRATORY (INHALATION)
Status: DISCONTINUED | OUTPATIENT
Start: 2020-02-04 | End: 2020-02-06 | Stop reason: HOSPADM

## 2020-02-04 RX ORDER — PREDNISONE 10 MG/1
10 TABLET ORAL DAILY
Status: CANCELLED | OUTPATIENT
Start: 2020-02-04

## 2020-02-04 RX ADMIN — Medication 1 CAPSULE: at 15:02

## 2020-02-04 RX ADMIN — METHYLPREDNISOLONE SODIUM SUCCINATE 40 MG: 40 INJECTION, POWDER, FOR SOLUTION INTRAMUSCULAR; INTRAVENOUS at 19:57

## 2020-02-04 RX ADMIN — OSELTAMIVIR PHOSPHATE 75 MG: 75 CAPSULE ORAL at 08:27

## 2020-02-04 RX ADMIN — HEPARIN SODIUM 5000 UNITS: 5000 INJECTION INTRAVENOUS; SUBCUTANEOUS at 08:26

## 2020-02-04 RX ADMIN — IPRATROPIUM BROMIDE 0.5 MG: 0.5 SOLUTION RESPIRATORY (INHALATION) at 07:13

## 2020-02-04 RX ADMIN — SODIUM CHLORIDE, PRESERVATIVE FREE 10 ML: 5 INJECTION INTRAVENOUS at 19:58

## 2020-02-04 RX ADMIN — IPRATROPIUM BROMIDE 0.5 MG: 0.5 SOLUTION RESPIRATORY (INHALATION) at 12:53

## 2020-02-04 RX ADMIN — SODIUM CHLORIDE 100 ML/HR: 9 INJECTION, SOLUTION INTRAVENOUS at 03:50

## 2020-02-04 RX ADMIN — SODIUM CHLORIDE, PRESERVATIVE FREE 10 ML: 5 INJECTION INTRAVENOUS at 08:23

## 2020-02-04 RX ADMIN — HEPARIN SODIUM 5000 UNITS: 5000 INJECTION INTRAVENOUS; SUBCUTANEOUS at 19:58

## 2020-02-04 RX ADMIN — SODIUM CHLORIDE, PRESERVATIVE FREE 10 ML: 5 INJECTION INTRAVENOUS at 03:11

## 2020-02-04 RX ADMIN — ATORVASTATIN CALCIUM 20 MG: 20 TABLET, FILM COATED ORAL at 19:56

## 2020-02-04 RX ADMIN — OSELTAMIVIR PHOSPHATE 75 MG: 75 CAPSULE ORAL at 19:56

## 2020-02-04 RX ADMIN — IPRATROPIUM BROMIDE 0.5 MG: 0.5 SOLUTION RESPIRATORY (INHALATION) at 04:12

## 2020-02-04 RX ADMIN — IPRATROPIUM BROMIDE AND ALBUTEROL SULFATE 3 ML: .5; 3 SOLUTION RESPIRATORY (INHALATION) at 19:38

## 2020-02-04 RX ADMIN — BUPROPION HYDROCHLORIDE 150 MG: 150 TABLET, FILM COATED, EXTENDED RELEASE ORAL at 19:57

## 2020-02-04 RX ADMIN — METHYLPREDNISOLONE SODIUM SUCCINATE 40 MG: 40 INJECTION, POWDER, FOR SOLUTION INTRAMUSCULAR; INTRAVENOUS at 08:23

## 2020-02-04 RX ADMIN — BUDESONIDE AND FORMOTEROL FUMARATE DIHYDRATE 2 PUFF: 160; 4.5 AEROSOL RESPIRATORY (INHALATION) at 18:39

## 2020-02-04 RX ADMIN — ACETYLCYSTEINE 3 ML: 200 SOLUTION ORAL; RESPIRATORY (INHALATION) at 19:38

## 2020-02-04 RX ADMIN — ACETYLCYSTEINE 3 ML: 200 SOLUTION ORAL; RESPIRATORY (INHALATION) at 12:53

## 2020-02-04 RX ADMIN — MONTELUKAST SODIUM 10 MG: 10 TABLET, COATED ORAL at 19:57

## 2020-02-04 RX ADMIN — BUPROPION HYDROCHLORIDE 150 MG: 150 TABLET, FILM COATED, EXTENDED RELEASE ORAL at 11:18

## 2020-02-04 NOTE — H&P
Hospitalist History and Physical        Patient Identification  Name: Vincenzo Fagan  Age/Sex: 59 y.o. male  :  1960        MRN: [id number]  Visit Number: [id number]  PCP: Laureen Bernard APRN          Chief complaint short of breath    History of Present Illness:  Patient is a 59 y.o. male with history of COPD/asthma who presents with worsening shortness of breath and cough of 3 days duration. He reports his cough is occasionally productive of clear sputum. He denies associated fever or chills. He reports some chest tightness associated with cough. He denies myalgias. He denies sick contacts. In the ED, he was found to be significantly tachycardic and tachypneic with hypoxia on room air. He tested positive for influenza. He has been admitted to the PCU for further management of COPD exacerbation, acute hypoxic respiratory failure, and severe sepsis secondary to influenza A infection.    Review of Systems  Review of Systems   Constitutional: Positive for activity change and fatigue. Negative for appetite change, chills, fever and unexpected weight change.   HENT: Negative for congestion, postnasal drip, rhinorrhea, sinus pressure, sinus pain and sore throat.    Eyes: Negative for photophobia, pain, discharge, redness, itching and visual disturbance.   Respiratory: Positive for cough, chest tightness, shortness of breath and wheezing.    Cardiovascular: Negative for chest pain, palpitations and leg swelling.   Gastrointestinal: Negative for abdominal distention, abdominal pain, constipation, diarrhea, nausea and vomiting.   Endocrine: Negative for cold intolerance, heat intolerance, polydipsia, polyphagia and polyuria.   Genitourinary: Negative for difficulty urinating, dysuria, flank pain, frequency and hematuria.   Musculoskeletal: Negative for arthralgias, back pain, joint swelling, myalgias, neck pain and neck stiffness.   Skin: Negative for color change, pallor, rash and wound.    Neurological: Negative for dizziness, tremors, seizures, syncope, facial asymmetry, speech difficulty, weakness, light-headedness, numbness and headaches.   Hematological: Negative for adenopathy. Does not bruise/bleed easily.   Psychiatric/Behavioral: Negative for agitation, behavioral problems and confusion.       History  Past Medical History:   Diagnosis Date   • Asthma    • COPD (chronic obstructive pulmonary disease) (CMS/[facility])    • Elevated cholesterol    • GERD (gastroesophageal reflux disease)    • Hyperlipidemia    • Multiple allergies      No past surgical history on file.  Family History   Problem Relation Age of Onset   • COPD Mother    • Diabetes Mother    • Hyperlipidemia Mother    • Lung cancer Father    • Cancer Father    • Lung cancer Sister      Social History     Tobacco Use   • Smoking status: Current Some Day Smoker     Packs/day: 0.50     Years: 0.50     Pack years: 0.25     Types: Cigarettes, Electronic Cigarette     Start date: 8/4/2019   • Smokeless tobacco: Never Used   Substance Use Topics   • Alcohol use: Never     Alcohol/week: 2.0 standard drinks     Types: 2 Cans of beer per week     Frequency: Never     Comment: Social/ with a meal    • Drug use: Never     Medications Prior to Admission   Medication Sig Dispense Refill Last Dose   • albuterol sulfate  (90 Base) MCG/ACT inhaler Inhale 2 puffs Every 4 (Four) Hours As Needed for Wheezing or Shortness of Air.   2/3/2020 at PM   • atorvastatin (LIPITOR) 20 MG tablet Take 1 tablet by mouth Every Night. 90 tablet 1 2/2/2020 at PM   • benzonatate (TESSALON) 200 MG capsule Take 1 capsule by mouth 3 (Three) Times a Day As Needed for Cough. 60 capsule 1 2/3/2020 at AM   • BREO ELLIPTA 200-25 MCG/INH inhaler Inhale 1 puff Every Night.   2/2/2020 at PM   • buPROPion SR (WELLBUTRIN SR) 150 MG 12 hr tablet Take 1 tablet by mouth 2 (Two) Times a Day. 60 tablet 5 2/3/2020 at PM   • INCRUSE ELLIPTA 62.5 MCG/INH aerosol powder  Inhale 1 puff  Daily.   2/3/2020 at AM   • ipratropium-albuterol (DUO-NEB) 0.5-2.5 mg/3 ml nebulizer Take 3 mL by nebulization 4 (Four) Times a Day As Needed for Wheezing or Shortness of Air.   2/3/2020 at PM   • montelukast (SINGULAIR) 10 MG tablet Take 10 mg by mouth Every Night.   2/2/2020 at PM   • predniSONE (DELTASONE) 10 MG tablet Take 10 mg by mouth Daily.   2/3/2020 at AM   • XOLAIR 150 MG injection Inject 150 mg under the skin into the appropriate area as directed Every 14 (Fourteen) Days.   1/31/2020 at Unknown time     Allergies:  Patient has no known allergies.    Objective     Vital Signs  Temp:  [98.4 °F (36.9 °C)-99.1 °F (37.3 °C)] 99.1 °F (37.3 °C)  Heart Rate:  [108-135] 108  Resp:  [24-48] 24  BP: (122-181)/(83-94) 130/83  Body mass index is 30.13 kg/m².    Physical Exam:  Physical Exam   Constitutional: He is oriented to person, place, and time. He appears well-developed and well-nourished. No distress.   HENT:   Head: Normocephalic and atraumatic.   Mouth/Throat: Oropharynx is clear and moist.   Eyes: Pupils are equal, round, and reactive to light. Conjunctivae and EOM are normal.   Neck: Normal range of motion. Neck supple. No JVD present.   Cardiovascular: Normal heart sounds and intact distal pulses.   No murmur heard.  Tachycardic, regular rhythm   Pulmonary/Chest: He has wheezes. He has no rales. He exhibits no tenderness.   Mildly tachypneic but much improved compared to while in ED. Diminished breath sounds throughout both lungs.   Abdominal: Soft. Bowel sounds are normal. He exhibits no distension. There is no tenderness.   Musculoskeletal: Normal range of motion. He exhibits no edema, tenderness or deformity.   Lymphadenopathy:     He has no cervical adenopathy.   Neurological: He is alert and oriented to person, place, and time.   No gross focal deficits appreciated on exam   Skin: Skin is warm and dry. Capillary refill takes less than 2 seconds. No rash noted. He is not diaphoretic. No erythema. No  pallor.   Psychiatric: He has a normal mood and affect. His behavior is normal. Judgment and thought content normal.         Results Review:       Lab Results:  Results from last 7 days   Lab Units 02/03/20 1932 01/28/20  0917   WBC 10*3/mm3 12.61* 10.02   HEMOGLOBIN g/dL 15.5 15.3   PLATELETS 10*3/mm3 294 274     Results from last 7 days   Lab Units 02/03/20 1932   CRP mg/dL 1.55*     Results from last 7 days   Lab Units 02/03/20  1932 01/28/20  0917   SODIUM mmol/L 136 136   POTASSIUM mmol/L 4.3 4.1   CHLORIDE mmol/L 98 97*   CO2 mmol/L 23.2 23.9   BUN mg/dL 11 10   CREATININE mg/dL 1.15 0.89   CALCIUM mg/dL 9.7 9.1   GLUCOSE mg/dL 131* 90     Results from last 7 days   Lab Units 02/03/20  1932 01/28/20  0917   MAGNESIUM mg/dL 2.0 2.2     No results found for: HGBA1C  Results from last 7 days   Lab Units 02/03/20  1932 01/28/20  0917   BILIRUBIN mg/dL 0.3 0.3   ALK PHOS U/L 77 63   AST (SGOT) U/L 16 15   ALT (SGPT) U/L 20 23     Results from last 7 days   Lab Units 02/03/20 1932   TROPONIN T ng/mL <0.010         Results from last 7 days   Lab Units 02/03/20 1932   INR  0.94     Results from last 7 days   Lab Units 02/03/20  2156   PH, ARTERIAL pH units 7.409   PO2 ART mm Hg 56.0*   PCO2, ARTERIAL mm Hg 41.6   HCO3 ART mmol/L 26.3*       I have reviewed the patient's laboratory results.    Imaging:  Imaging Results (Last 72 Hours)     Procedure Component Value Units Date/Time    CT Chest Pulmonary Embolism [[id number]] Collected:  02/03/20 2249     Updated:  02/03/20 2251    Narrative:       CT CHEST WITH CONTRAST, PE PROTOCOL, 2/3/2020    HISTORY:  59-year-old male in the ED with new-onset shortness of air and cough.    TECHNIQUE:  CT examination of the chest with IV contrast. CTA MIP multiplanar pulmonary artery images were reformatted with 3-D postprocessing. Radiation dose reduction techniques included automated exposure control. Radiation audit for CT and nuclear cardiology  exams in the last 12 months:  0.    FINDINGS:  No convincing evidence of pulmonary embolism. Streak artifact over a single posterior subsegmental pulmonary artery in the right lower lung is noted, but this does not convincingly represent intraluminal filling defect. Pulmonary arteries are normal in  caliber. Heart size is normal, and there is no pericardial effusion. Normal caliber thoracic aorta.    Lung image detail is degraded by patient respiratory motion artifact. Central bronchial wall thickening suggests active bronchitis, correlate clinically. No visible pulmonary infiltrate or pleural effusion. No suspicious mass or adenopathy within the  mediastinum or pulmonary partha.    Limited upper abdominal images show no significant abnormality. There is no gastric distention, hiatal hernia or thoracic esophageal dilatation.      Impression:       1. No evidence of pulmonary embolism or other acute vascular abnormality within the chest.  2. Motion limited examination. Likely mild central bronchial wall thickening. This may be associated with active bronchitis. The lungs appear clear.    Signer Name: Chinedu Ortiz MD   Signed: 2/3/2020 10:49 PM   Workstation Name: [facility]-    [facility]    XR Chest 1 View [[id number]] Collected:  02/03/20 2007     Updated:  02/03/20 2009    Narrative:       EXAMINATION: XR CHEST 1 VW-      CLINICAL INDICATION:     cough, sob     TECHNIQUE:  XR CHEST 1 VW-      COMPARISON: 06/08/2015      FINDINGS:      Lungs are aerated.   Heart size, mediastinum, and pulmonary vascularity are unremarkable.   No pneumothorax.   No effusions.   No acute osseous findings.          Impression:       No radiographic evidence of acute cardiac or pulmonary  disease.     This report was finalized on 2/3/2020 8:07 PM by Dr. Mina Solitario MD.             I have personally reviewed the patient's radiologic imaging.    EKG: Sinus tachycardia, , QTc 397, no overt ST changes appreciated    I have  personally reviewed the patient's EKG.    Assessment/Plan     - Severe sepsis present on admission, with tachycardia, tachypnea and leukocytosis along with CRP elevation, secondary to influenza A infection: treat with tamiflu, IV fluid hydration. Lactate trending down. Continue to trend WBC and CRP. Blood cultures pending.  - Acute COPD exacerbation: treat with IV solu-medrol and scheduled nebs.   - Acute respiratory failure with hypoxia (CMS/HCC), secondary to above: continue above treatment. Continue supplemental O2, wean as patient tolerates.  - tobacco abuse: encouraged to quit. Patient states he had quite for about 7 years before starting back 6 months ago due to increased stress at home; says he is taking medication to help him quit, not chantix (maybe wellbutrin?). Will review home meds once reconciled. Offered nicotine patch but patient declined for now.     DVT Prophylaxis: SQ heparin    Estimated Length of Stay >2 midnights    I discussed the patient's findings, assessment and plan with the patient and his RN Griselda in the PCU.    * patient is high risk due to severe sepsis, influenza A infection, COPD exacerbation, acute respiratory failure with hypoxia    Ted Joyner MD  02/04/20  1:53 AM

## 2020-02-04 NOTE — ED NOTES
Pt signed consent for CT with contrast and placed on Pt chart at this time.      Farida Mejia RN  02/03/20 6146

## 2020-02-04 NOTE — PAYOR COMM NOTE
"  [facility]  NPI: [id number]    Utilization Review   Contact:Shannan Go MSN, APRN, NP-C  Phone: [phone]  Fax: [phone]    [place] state/Attn: nurse review  Inpatient Auth Req  REF: [id number]    Vincenzo Fagan (59 y.o. Male)     Date of Birth Social Security Number Address Home Phone MRN    1960  [address] [facility] [address] [phone] [id number]    Confucianism Marital Status          None        Admission Date Admission Type Admitting Provider Attending Provider Department, Room/Bed    2/3/20 Emergency Ted Joyner MD Troxell, Christopher A, DO [facility] PROGRESS CARE, P208/1P    Discharge Date Discharge Disposition Discharge Destination                       Attending Provider:  Willian Montesinos DO    Allergies:  No Known Allergies    Isolation:  Droplet   Infection:  Influenza (20)   Code Status:  CPR    Ht:  177.8 cm (70\")   Wt:  91.7 kg (202 lb 3.2 oz)    Admission Cmt:  None   Principal Problem:  None                Active Insurance as of 2/3/2020     Primary Coverage     Payor Plan Insurance Group Employer/Plan Group    [employer] EMPLOYEE [id number]     Payor Plan Address Payor Plan Phone Number Payor Plan Fax Number Effective Dates    [address] [phone]  2017 - None Entered    [place] [address]       Subscriber Name Subscriber Birth Date Member ID       VINCENZO FAGAN 1960 [id number]                 Emergency Contacts      (Rel.) Home Phone Work Phone Mobile Phone    Sultana Fagan (Spouse) [phone] -- --               History & Physical      Ted Joyner MD at 20 0153          Hospitalist History and Physical        Patient Identification  Name: Vincenzo Fagan  Age/Sex: 59 y.o. male  :  1960        MRN: [id number]  Visit Number: [id number]  PCP: Laureen Bernard APRN          Chief complaint short of " breath    History of Present Illness:  Patient is a 59 y.o. male with history of COPD/asthma who presents with worsening shortness of breath and cough of 3 days duration. He reports his cough is occasionally productive of clear sputum. He denies associated fever or chills. He reports some chest tightness associated with cough. He denies myalgias. He denies sick contacts. In the ED, he was found to be significantly tachycardic and tachypneic with hypoxia on room air. He tested positive for influenza. He has been admitted to the PCU for further management of COPD exacerbation, acute hypoxic respiratory failure, and severe sepsis secondary to influenza A infection.    Review of Systems  Review of Systems   Constitutional: Positive for activity change and fatigue. Negative for appetite change, chills, fever and unexpected weight change.   HENT: Negative for congestion, postnasal drip, rhinorrhea, sinus pressure, sinus pain and sore throat.    Eyes: Negative for photophobia, pain, discharge, redness, itching and visual disturbance.   Respiratory: Positive for cough, chest tightness, shortness of breath and wheezing.    Cardiovascular: Negative for chest pain, palpitations and leg swelling.   Gastrointestinal: Negative for abdominal distention, abdominal pain, constipation, diarrhea, nausea and vomiting.   Endocrine: Negative for cold intolerance, heat intolerance, polydipsia, polyphagia and polyuria.   Genitourinary: Negative for difficulty urinating, dysuria, flank pain, frequency and hematuria.   Musculoskeletal: Negative for arthralgias, back pain, joint swelling, myalgias, neck pain and neck stiffness.   Skin: Negative for color change, pallor, rash and wound.   Neurological: Negative for dizziness, tremors, seizures, syncope, facial asymmetry, speech difficulty, weakness, light-headedness, numbness and headaches.   Hematological: Negative for adenopathy. Does not bruise/bleed easily.   Psychiatric/Behavioral:  Negative for agitation, behavioral problems and confusion.       History  Past Medical History:   Diagnosis Date   • Asthma    • COPD (chronic obstructive pulmonary disease) (CMS/[facility])    • Elevated cholesterol    • GERD (gastroesophageal reflux disease)    • Hyperlipidemia    • Multiple allergies      No past surgical history on file.  Family History   Problem Relation Age of Onset   • COPD Mother    • Diabetes Mother    • Hyperlipidemia Mother    • Lung cancer Father    • Cancer Father    • Lung cancer Sister      Social History     Tobacco Use   • Smoking status: Current Some Day Smoker     Packs/day: 0.50     Years: 0.50     Pack years: 0.25     Types: Cigarettes, Electronic Cigarette     Start date: 8/4/2019   • Smokeless tobacco: Never Used   Substance Use Topics   • Alcohol use: Never     Alcohol/week: 2.0 standard drinks     Types: 2 Cans of beer per week     Frequency: Never     Comment: Social/ with a meal    • Drug use: Never     Medications Prior to Admission   Medication Sig Dispense Refill Last Dose   • albuterol sulfate  (90 Base) MCG/ACT inhaler Inhale 2 puffs Every 4 (Four) Hours As Needed for Wheezing or Shortness of Air.   2/3/2020 at PM   • atorvastatin (LIPITOR) 20 MG tablet Take 1 tablet by mouth Every Night. 90 tablet 1 2/2/2020 at PM   • benzonatate (TESSALON) 200 MG capsule Take 1 capsule by mouth 3 (Three) Times a Day As Needed for Cough. 60 capsule 1 2/3/2020 at AM   • BREO ELLIPTA 200-25 MCG/INH inhaler Inhale 1 puff Every Night.   2/2/2020 at PM   • buPROPion SR (WELLBUTRIN SR) 150 MG 12 hr tablet Take 1 tablet by mouth 2 (Two) Times a Day. 60 tablet 5 2/3/2020 at PM   • INCRUSE ELLIPTA 62.5 MCG/INH aerosol powder  Inhale 1 puff Daily.   2/3/2020 at AM   • ipratropium-albuterol (DUO-NEB) 0.5-2.5 mg/3 ml nebulizer Take 3 mL by nebulization 4 (Four) Times a Day As Needed for Wheezing or Shortness of Air.   2/3/2020 at PM   • montelukast (SINGULAIR) 10 MG tablet Take 10 mg by mouth  Every Night.   2/2/2020 at PM   • predniSONE (DELTASONE) 10 MG tablet Take 10 mg by mouth Daily.   2/3/2020 at AM   • XOLAIR 150 MG injection Inject 150 mg under the skin into the appropriate area as directed Every 14 (Fourteen) Days.   1/31/2020 at Unknown time     Allergies:  Patient has no known allergies.    Objective     Vital Signs  Temp:  [98.4 °F (36.9 °C)-99.1 °F (37.3 °C)] 99.1 °F (37.3 °C)  Heart Rate:  [108-135] 108  Resp:  [24-48] 24  BP: (122-181)/(83-94) 130/83  Body mass index is 30.13 kg/m².    Physical Exam:  Physical Exam   Constitutional: He is oriented to person, place, and time. He appears well-developed and well-nourished. No distress.   HENT:   Head: Normocephalic and atraumatic.   Mouth/Throat: Oropharynx is clear and moist.   Eyes: Pupils are equal, round, and reactive to light. Conjunctivae and EOM are normal.   Neck: Normal range of motion. Neck supple. No JVD present.   Cardiovascular: Normal heart sounds and intact distal pulses.   No murmur heard.  Tachycardic, regular rhythm   Pulmonary/Chest: He has wheezes. He has no rales. He exhibits no tenderness.   Mildly tachypneic but much improved compared to while in ED. Diminished breath sounds throughout both lungs.   Abdominal: Soft. Bowel sounds are normal. He exhibits no distension. There is no tenderness.   Musculoskeletal: Normal range of motion. He exhibits no edema, tenderness or deformity.   Lymphadenopathy:     He has no cervical adenopathy.   Neurological: He is alert and oriented to person, place, and time.   No gross focal deficits appreciated on exam   Skin: Skin is warm and dry. Capillary refill takes less than 2 seconds. No rash noted. He is not diaphoretic. No erythema. No pallor.   Psychiatric: He has a normal mood and affect. His behavior is normal. Judgment and thought content normal.         Results Review:       Lab Results:  Results from last 7 days   Lab Units 02/03/20 1932 01/28/20  0917   WBC 10*3/mm3 12.61* 10.02    HEMOGLOBIN g/dL 15.5 15.3   PLATELETS 10*3/mm3 294 274     Results from last 7 days   Lab Units 02/03/20  1932   CRP mg/dL 1.55*     Results from last 7 days   Lab Units 02/03/20  1932 01/28/20  0917   SODIUM mmol/L 136 136   POTASSIUM mmol/L 4.3 4.1   CHLORIDE mmol/L 98 97*   CO2 mmol/L 23.2 23.9   BUN mg/dL 11 10   CREATININE mg/dL 1.15 0.89   CALCIUM mg/dL 9.7 9.1   GLUCOSE mg/dL 131* 90     Results from last 7 days   Lab Units 02/03/20  1932 01/28/20  0917   MAGNESIUM mg/dL 2.0 2.2     No results found for: HGBA1C  Results from last 7 days   Lab Units 02/03/20  1932 01/28/20  0917   BILIRUBIN mg/dL 0.3 0.3   ALK PHOS U/L 77 63   AST (SGOT) U/L 16 15   ALT (SGPT) U/L 20 23     Results from last 7 days   Lab Units 02/03/20  1932   TROPONIN T ng/mL <0.010         Results from last 7 days   Lab Units 02/03/20  1932   INR  0.94     Results from last 7 days   Lab Units 02/03/20  2156   PH, ARTERIAL pH units 7.409   PO2 ART mm Hg 56.0*   PCO2, ARTERIAL mm Hg 41.6   HCO3 ART mmol/L 26.3*       I have reviewed the patient's laboratory results.    Imaging:  Imaging Results (Last 72 Hours)     Procedure Component Value Units Date/Time    CT Chest Pulmonary Embolism [[id number]] Collected:  02/03/20 2249     Updated:  02/03/20 2251    Narrative:       CT CHEST WITH CONTRAST, PE PROTOCOL, 2/3/2020    HISTORY:  59-year-old male in the ED with new-onset shortness of air and cough.    TECHNIQUE:  CT examination of the chest with IV contrast. CTA MIP multiplanar pulmonary artery images were reformatted with 3-D postprocessing. Radiation dose reduction techniques included automated exposure control. Radiation audit for CT and nuclear cardiology  exams in the last 12 months: 0.    FINDINGS:  No convincing evidence of pulmonary embolism. Streak artifact over a single posterior subsegmental pulmonary artery in the right lower lung is noted, but this does not convincingly represent intraluminal filling defect. Pulmonary arteries are  normal in  caliber. Heart size is normal, and there is no pericardial effusion. Normal caliber thoracic aorta.    Lung image detail is degraded by patient respiratory motion artifact. Central bronchial wall thickening suggests active bronchitis, correlate clinically. No visible pulmonary infiltrate or pleural effusion. No suspicious mass or adenopathy within the  mediastinum or pulmonary partha.    Limited upper abdominal images show no significant abnormality. There is no gastric distention, hiatal hernia or thoracic esophageal dilatation.      Impression:       1. No evidence of pulmonary embolism or other acute vascular abnormality within the chest.  2. Motion limited examination. Likely mild central bronchial wall thickening. This may be associated with active bronchitis. The lungs appear clear.    Signer Name: Chinedu Ortiz MD   Signed: 2/3/2020 10:49 PM   Workstation Name: [id number]    [facility]    XR Chest 1 View [[id number]] Collected:  02/03/20 2007     Updated:  02/03/20 2009    Narrative:       EXAMINATION: XR CHEST 1 VW-      CLINICAL INDICATION:     cough, sob     TECHNIQUE:  XR CHEST 1 VW-      COMPARISON: 06/08/2015      FINDINGS:      Lungs are aerated.   Heart size, mediastinum, and pulmonary vascularity are unremarkable.   No pneumothorax.   No effusions.   No acute osseous findings.          Impression:       No radiographic evidence of acute cardiac or pulmonary  disease.     This report was finalized on 2/3/2020 8:07 PM by Dr. Mina Solitario MD.             I have personally reviewed the patient's radiologic imaging.    EKG: Sinus tachycardia, , QTc 397, no overt ST changes appreciated    I have personally reviewed the patient's EKG.    Assessment/Plan     - Severe sepsis present on admission, with tachycardia, tachypnea and leukocytosis along with CRP elevation, secondary to influenza A infection: treat with tamiflu, IV fluid hydration. Lactate trending  down. Continue to trend WBC and CRP. Blood cultures pending.  - Acute COPD exacerbation: treat with IV solu-medrol and scheduled nebs.   - Acute respiratory failure with hypoxia (CMS/HCC), secondary to above: continue above treatment. Continue supplemental O2, wean as patient tolerates.  - tobacco abuse: encouraged to quit. Patient states he had quite for about 7 years before starting back 6 months ago due to increased stress at home; says he is taking medication to help him quit, not chantix (maybe wellbutrin?). Will review home meds once reconciled. Offered nicotine patch but patient declined for now.     DVT Prophylaxis: SQ heparin    Estimated Length of Stay >2 midnights    I discussed the patient's findings, assessment and plan with the patient and his RN Griselda in the PCU.    * patient is high risk due to severe sepsis, influenza A infection, COPD exacerbation, acute respiratory failure with hypoxia    Ted Joyner MD  02/04/20  1:53 AM      Electronically signed by Ted Joyner MD at 02/04/20 0205          Emergency Department Notes      Mar Swanson, APRN at 02/03/20 1906          Subjective   59-year-old male who presents to the ED today for shortness of breath.  He states he has a history of COPD.  He has had worsening shortness of breath over the last 3 days.  He states he has had a cough that has been productive of clear, thick sputum.  He denies any fever.  He states he has a headache from coughing so much.  He has tried Tessalon Perles, his regular inhalers and breathing treatments at home with no relief.  He states he is also on daily prednisone.  He does smoke half a pack of cigarettes a day.  He denies any chest pain at this time.      History provided by:  Patient  Shortness of Breath   Severity:  Moderate  Onset quality:  Gradual  Duration:  3 days  Timing:  Constant  Progression:  Worsening  Chronicity:  New  Relieved by:  Nothing  Worsened by:   Coughing  Ineffective treatments:  Inhaler  Associated symptoms: cough, headaches, sputum production and wheezing    Associated symptoms: no abdominal pain, no chest pain, no claudication, no diaphoresis, no ear pain, no fever, no hemoptysis, no neck pain, no PND, no rash, no sore throat, no syncope, no swollen glands and no vomiting    Risk factors: tobacco use        Review of Systems   Constitutional: Negative for diaphoresis and fever.   HENT: Negative for ear pain and sore throat.    Eyes: Negative.    Respiratory: Positive for cough, sputum production, shortness of breath and wheezing. Negative for hemoptysis.    Cardiovascular: Negative for chest pain, claudication, syncope and PND.   Gastrointestinal: Negative for abdominal pain and vomiting.   Genitourinary: Negative.    Musculoskeletal: Negative for neck pain.   Skin: Negative for rash.   Neurological: Positive for headaches.   Psychiatric/Behavioral: Negative.    All other systems reviewed and are negative.      Past Medical History:   Diagnosis Date   • Asthma    • COPD (chronic obstructive pulmonary disease) (CMS/[facility])    • Multiple allergies        No Known Allergies    No past surgical history on file.    Family History   Problem Relation Age of Onset   • COPD Mother    • Diabetes Mother    • Lung cancer Father        Social History     Socioeconomic History   • Marital status:      Spouse name: Not on file   • Number of children: Not on file   • Years of education: Not on file   • Highest education level: Not on file   Tobacco Use   • Smoking status: Current Some Day Smoker     Types: Cigarettes   • Smokeless tobacco: Never Used   Substance and Sexual Activity   • Alcohol use: Yes     Alcohol/week: 2.0 standard drinks     Types: 2 Cans of beer per week     Comment: Social/ with a meal    • Drug use: Never   • Sexual activity: Defer           Objective   Physical Exam   Constitutional: He is oriented to person, place, and time. He appears  well-developed and well-nourished. No distress.   HENT:   Head: Normocephalic and atraumatic.   Mouth/Throat: Oropharynx is clear and moist.   Eyes: Pupils are equal, round, and reactive to light. EOM are normal.   Neck: Normal range of motion. Neck supple. No JVD present. No tracheal deviation present.   Cardiovascular: Regular rhythm, normal heart sounds and intact distal pulses. Tachycardia present.   Pulmonary/Chest: Tachypnea noted. He has wheezes (diffusely).   Very frequent cough   Abdominal: Soft. Bowel sounds are normal.   Musculoskeletal: Normal range of motion.        Right lower leg: Normal.        Left lower leg: Normal.   Neurological: He is alert and oriented to person, place, and time.   Skin: Skin is warm and dry. Capillary refill takes less than 2 seconds.   Psychiatric: He has a normal mood and affect. His behavior is normal.   Nursing note and vitals reviewed.      Procedures          ED Course  ED Course as of Feb 03 2315   Mon Feb 03, 2020 1913 EKG performed at 17:59  Sinus tachycardia, rate of 132  No acute ischemia, reviewed by Dr. Pandey    []   2004 Endorsed to Mar Swanson    []   2133 Discussed with Dr. Joyner, will order CT and repeat ABG  I reassessed patient who says he feels much better    []      ED Course User Index  [] Klarissa Thakur PA  [] Mar Swanson, APRN                                               MDM  Number of Diagnoses or Management Options  Hypoxia: new and requires workup  Influenza A: new and requires workup     Amount and/or Complexity of Data Reviewed  Clinical lab tests: reviewed  Tests in the radiology section of CPT®:  reviewed and ordered  Tests in the medicine section of CPT®:  reviewed    Risk of Complications, Morbidity, and/or Mortality  Presenting problems: moderate  Diagnostic procedures: moderate  Management options: moderate  General comments: Symptoms improved in ED  Will be admitted to hospital for further evaluation and  treatment    Patient Progress  Patient progress: improved      Final diagnoses:   Influenza A   Hypoxia            Mar Swanson, СЕРГЕЙ  02/04/20 0424      Electronically signed by Mar Swanosn APRN at 02/04/20 0424     Farida Mejia, RN at 02/03/20 2205        Pt signed consent for CT with contrast and placed on Pt chart at this time.      Farida Mejia RN  02/03/20 2209      Electronically signed by Farida Mejia RN at 02/03/20 2209       Vital Signs (last day)     Date/Time   Temp   Temp src   Pulse   Resp   BP   Patient Position   SpO2    02/04/20 0721   --   --   112   18   --   --   97    02/04/20 0713   --   --   108   18   --   --   93    02/04/20 0412   --   --   120   24   --   --   92    02/04/20 0400   --   --   --   24   --   Lying   --    02/04/20 0300   --   --   107   24   144/81   Sitting   92    02/04/20 0200   --   --   111   23   148/87   Lying   95    02/04/20 0100   --   --   108   23   135/84   Lying   93    02/04/20 0011   --   --   108   --   130/83   --   91    02/03/20 2359   --   --   110   24   --   --   97    02/03/20 2356   --   --   112   --   139/94   --   96    02/03/20 2346   --   --   115   24   --   --   94    02/03/20 2342   --   --   108   --   134/91   --   (!) 66    02/03/20 2326   --   --   111   --   122/83   --   (!) 61    02/03/20 21:43:42   99.1 (37.3)   Oral   --   --   --   --   --    02/03/20 2034   --   --   (!) 132   (!) 32   --   --   92    02/03/20 2025   --   --   (!) 123   24   --   --   91    02/03/20 1925   --   --   (!) 134   (!) 36   --   --   91    02/03/20 1919   --   --   109   (!) 48   --   --   (!) 82    02/03/20 1758   98.4 (36.9)   Axillary   (!) 135   24   (!) 181/87   Sitting   90              Oxygen Therapy (last day)     Date/Time   SpO2   Device (Oxygen Therapy)   Flow (L/min)   Oxygen Concentration (%)   ETCO2 (mmHg)    02/04/20 0721   97   --   --   --   --    02/04/20 0713   93   nasal cannula   2   --   --     02/04/20 0412   92   nasal cannula   2   --   --    02/04/20 0400   --   nasal cannula   1   --   --    02/04/20 0300   92   nasal cannula   1   --   --    02/04/20 0200   95   nasal cannula   1   --   --    02/04/20 0100   93   nasal cannula   1   --   --    02/04/20 0030   --   nasal cannula   1   --   --    02/04/20 0011   91   --   --   --   --    02/03/20 2359   97   nasal cannula   1   --   --    02/03/20 2356   96   --   --   --   --    02/03/20 2346   94   nasal cannula   1   --   --    02/03/20 2342   (!) 66   --   --   --   --    02/03/20 2326   (!) 61   --   --   --   --    02/03/20 2034   92   nasal cannula   1   --   --    02/03/20 2025   91   nasal cannula   1   --   --    02/03/20 1925   91   room air   --   --   --    02/03/20 1919   (!) 82   room air   --   --   --    02/03/20 1758   90   --   --   --   --                Lab Results (last 24 hours)     Procedure Component Value Units Date/Time    Blood Culture - Blood, Arm, Left [[id number]] Collected:  02/03/20 1957    Specimen:  Blood from Arm, Left Updated:  02/04/20 0801     Blood Culture No growth at less than 24 hours    Blood Culture - Blood, Arm, Right [[id number]] Collected:  02/03/20 1932    Specimen:  Blood from Arm, Right Updated:  02/04/20 0745     Blood Culture No growth at less than 24 hours    Urinalysis With Microscopic If Indicated (No Culture) - Urine, Clean Catch [[id number]]  (Normal) Collected:  02/04/20 0442    Specimen:  Urine, Clean Catch Updated:  02/04/20 0449     Color, UA Yellow     Appearance, UA Clear     pH, UA 6.0     Specific Gravity, UA 1.021     Glucose, UA Negative     Ketones, UA Negative     Bilirubin, UA Negative     Blood, UA Negative     Protein, UA Negative     Leuk Esterase, UA Negative     Nitrite, UA Negative     Urobilinogen, UA 0.2 E.U./dL    Narrative:       Urine microscopic not indicated.    Comprehensive Metabolic Panel [[id number]]  (Abnormal) Collected:  02/04/20 0227    Specimen:  Blood Updated:   02/04/20 0249     Glucose 143 mg/dL      BUN 11 mg/dL      Creatinine 0.99 mg/dL      Sodium 134 mmol/L      Potassium 4.2 mmol/L      Chloride 101 mmol/L      CO2 22.9 mmol/L      Calcium 8.8 mg/dL      Total Protein 6.9 g/dL      Albumin 3.80 g/dL      ALT (SGPT) 20 U/L      AST (SGOT) 14 U/L      Alkaline Phosphatase 67 U/L      Total Bilirubin 0.3 mg/dL      eGFR Non African Amer 78 mL/min/1.73      Globulin 3.1 gm/dL      A/G Ratio 1.2 g/dL      BUN/Creatinine Ratio 11.1     Anion Gap 10.1 mmol/L     Narrative:       GFR Normal >60  Chronic Kidney Disease <60  Kidney Failure <15      C-reactive Protein [[id number]]  (Abnormal) Collected:  02/04/20 0227    Specimen:  Blood Updated:  02/04/20 0249     C-Reactive Protein 2.15 mg/dL     CBC Auto Differential [[id number]]  (Abnormal) Collected:  02/04/20 0227    Specimen:  Blood Updated:  02/04/20 0234     WBC 10.15 10*3/mm3      RBC 4.94 10*6/mm3      Hemoglobin 14.2 g/dL      Hematocrit 45.6 %      MCV 92.3 fL      MCH 28.7 pg      MCHC 31.1 g/dL      RDW 13.0 %      RDW-SD 43.9 fl      MPV 10.1 fL      Platelets 249 10*3/mm3      Neutrophil % 95.1 %      Lymphocyte % 3.1 %      Monocyte % 1.2 %      Eosinophil % 0.0 %      Basophil % 0.1 %      Immature Grans % 0.5 %      Neutrophils, Absolute 9.66 10*3/mm3      Lymphocytes, Absolute 0.31 10*3/mm3      Monocytes, Absolute 0.12 10*3/mm3      Eosinophils, Absolute 0.00 10*3/mm3      Basophils, Absolute 0.01 10*3/mm3      Immature Grans, Absolute 0.05 10*3/mm3      nRBC 0.0 /100 WBC     Lactic Acid, Reflex [[id number]]  (Normal) Collected:  02/04/20 0115    Specimen:  Blood Updated:  02/04/20 0151     Lactate 1.9 mmol/L     Lactic Acid, Reflex Timer (This will reflex a repeat order 3-3:15 hours after ordered.) [[id number]] Collected:  02/03/20 1932    Specimen:  Blood Updated:  02/03/20 2315     Extra Tube Hold for add-ons.     Comment: Auto resulted.       Blood Gas, Arterial With Co-Ox [[id number]]  (Abnormal)  Collected:  02/03/20 2156    Specimen:  Arterial Blood Updated:  02/03/20 2158     Site Left Radial     Grady's Test Positive     pH, Arterial 7.409 pH units      pCO2, Arterial 41.6 mm Hg      pO2, Arterial 56.0 mm Hg      Comment: 84 Value below reference range        HCO3, Arterial 26.3 mmol/L      Base Excess, Arterial 1.4 mmol/L      O2 Saturation, Arterial 89.9 %      Comment: 84 Value below reference range        Hemoglobin, Blood Gas 15.0 g/dL      Hematocrit, Blood Gas 45.9 %      Oxyhemoglobin 88.7 %      Comment: 84 Value below reference range        Methemoglobin 0.40 %      Carboxyhemoglobin 0.9 %      Comment: 84 Value below reference range        A-a Gradiant 59.9 mmHg      CO2 Content 27.5 mmol/L      Temperature 0.0 C      Barometric Pressure for Blood Gas 725 mmHg      Modality Nasal Cannula     FIO2 24 %      Flow Rate 1.0 lpm      Ventilator Mode NA     Note --     Collected by [id number]     Comment: Meter: [id number]     :  [id number]        pH, Temp Corrected --     pCO2, Temperature Corrected --     pO2, Temperature Corrected --    [place] Draw [[id number]] Collected:  02/03/20 1932    Specimen:  Blood Updated:  02/03/20 2045    Narrative:       The following orders were created for panel order [place] Draw.  Procedure                               Abnormality         Status                     ---------                               -----------         ------                     Light Blue Top[[id number]]                                   Final result               Green Top (Gel)[[id number]]                                  Final result               Lavender Top[[id number]]                                     Final result               Gold Top - SST[[id number]]                                   Final result                 Please view results for these tests on the individual orders.    Light Blue Top [[id number]] Collected:  02/03/20 1932    Specimen:  Blood Updated:  02/03/20 2045      Extra Tube hold for add-on     Comment: Auto resulted       Lavender Top [[id number]] Collected:  02/03/20 1932    Specimen:  Blood Updated:  02/03/20 2045     Extra Tube hold for add-on     Comment: Auto resulted       Gold Top - SST [[id number]] Collected:  02/03/20 1932    Specimen:  Blood Updated:  02/03/20 2045     Extra Tube Hold for add-ons.     Comment: Auto resulted.       Green Top (Gel) [[id number]] Collected:  02/03/20 1932    Specimen:  Blood Updated:  02/03/20 2045     Extra Tube Hold for add-ons.     Comment: Auto resulted.       Lactic Acid, Plasma [[id number]]  (Abnormal) Collected:  02/03/20 1932    Specimen:  Blood Updated:  02/03/20 2008     Lactate 2.5 mmol/L     Comprehensive Metabolic Panel [[id number]]  (Abnormal) Collected:  02/03/20 1932    Specimen:  Blood Updated:  02/03/20 2004     Glucose 131 mg/dL      BUN 11 mg/dL      Creatinine 1.15 mg/dL      Sodium 136 mmol/L      Potassium 4.3 mmol/L      Chloride 98 mmol/L      CO2 23.2 mmol/L      Calcium 9.7 mg/dL      Total Protein 7.9 g/dL      Albumin 4.34 g/dL      ALT (SGPT) 20 U/L      AST (SGOT) 16 U/L      Alkaline Phosphatase 77 U/L      Total Bilirubin 0.3 mg/dL      eGFR Non African Amer 65 mL/min/1.73      Globulin 3.6 gm/dL      A/G Ratio 1.2 g/dL      BUN/Creatinine Ratio 9.6     Anion Gap 14.8 mmol/L     Narrative:       GFR Normal >60  Chronic Kidney Disease <60  Kidney Failure <15      Troponin [[id number]]  (Normal) Collected:  02/03/20 1932    Specimen:  Blood Updated:  02/03/20 2004     Troponin T <0.010 ng/mL     Narrative:       Troponin T Reference Range:  <= 0.03 ng/mL-   Negative for AMI  >0.03 ng/mL-     Abnormal for myocardial necrosis.  Clinicians would have to utilize clinical acumen, EKG, Troponin and serial changes to determine if it is an Acute Myocardial Infarction or myocardial injury due to an underlying chronic condition.       Results may be falsely decreased if patient taking Biotin.      C-reactive Protein  [[id number]]  (Abnormal) Collected:  02/03/20 1932    Specimen:  Blood Updated:  02/03/20 2004     C-Reactive Protein 1.55 mg/dL     Magnesium [[id number]]  (Normal) Collected:  02/03/20 1932    Specimen:  Blood Updated:  02/03/20 2004     Magnesium 2.0 mg/dL     Influenza Antigen, Rapid - Swab, Nasopharynx [[id number]]  (Abnormal) Collected:  02/03/20 1946    Specimen:  Swab from Nasopharynx Updated:  02/03/20 2003     Influenza A Ag, EIA Positive     Influenza B Ag, EIA Negative    BNP [[id number]]  (Normal) Collected:  02/03/20 1932    Specimen:  Blood Updated:  02/03/20 2002     proBNP 119.3 pg/mL     Narrative:       Among patients with dyspnea, NT-proBNP is highly sensitive for the detection of acute congestive heart failure. In addition NT-proBNP of <300 pg/ml effectively rules out acute congestive heart failure with 99% negative predictive value.    Results may be falsely decreased if patient taking Biotin.      Protime-INR [[id number]]  (Normal) Collected:  02/03/20 1932    Specimen:  Blood Updated:  02/03/20 1953     Protime 13.1 Seconds      INR 0.94    Narrative:       Suggested INR therapeutic range for stable oral anticoagulant therapy:    Low Intensity therapy:   1.5-2.0  Moderate Intensity therapy:   2.0-3.0  High Intensity therapy:   2.5-4.0    aPTT [[id number]]  (Normal) Collected:  02/03/20 1932    Specimen:  Blood Updated:  02/03/20 1953     PTT 32.3 seconds     Narrative:       PTT Heparin Therapeutic Range:  59 - 95 seconds      CBC & Differential [[id number]] Collected:  02/03/20 1932    Specimen:  Blood Updated:  02/03/20 1938    Narrative:       The following orders were created for panel order CBC & Differential.  Procedure                               Abnormality         Status                     ---------                               -----------         ------                     CBC Auto Differential[[id number]]        Abnormal            Final result                 Please view results for  these tests on the individual orders.    CBC Auto Differential [[id number]]  (Abnormal) Collected:  02/03/20 1932    Specimen:  Blood Updated:  02/03/20 1938     WBC 12.61 10*3/mm3      RBC 5.37 10*6/mm3      Hemoglobin 15.5 g/dL      Hematocrit 49.1 %      MCV 91.4 fL      MCH 28.9 pg      MCHC 31.6 g/dL      RDW 12.8 %      RDW-SD 43.1 fl      MPV 9.9 fL      Platelets 294 10*3/mm3      Neutrophil % 86.2 %      Lymphocyte % 4.4 %      Monocyte % 8.6 %      Eosinophil % 0.0 %      Basophil % 0.2 %      Immature Grans % 0.6 %      Neutrophils, Absolute 10.87 10*3/mm3      Lymphocytes, Absolute 0.56 10*3/mm3      Monocytes, Absolute 1.09 10*3/mm3      Eosinophils, Absolute 0.00 10*3/mm3      Basophils, Absolute 0.02 10*3/mm3      Immature Grans, Absolute 0.07 10*3/mm3      nRBC 0.0 /100 WBC     Blood Gas, Arterial With Co-Ox [[id number]]  (Abnormal) Collected:  02/03/20 1925    Specimen:  Arterial Blood Updated:  02/03/20 1929     Site Left Radial     Grady's Test Positive     pH, Arterial 7.359 pH units      pCO2, Arterial 46.2 mm Hg      Comment: 83 Value above reference range        pO2, Arterial 57.4 mm Hg      Comment: 84 Value below reference range        HCO3, Arterial 26.0 mmol/L      Base Excess, Arterial 0.0 mmol/L      O2 Saturation, Arterial 89.0 %      Comment: 84 Value below reference range        Hemoglobin, Blood Gas 15.8 g/dL      Hematocrit, Blood Gas 48.5 %      Oxyhemoglobin 87.8 %      Comment: 84 Value below reference range        Methemoglobin 0.40 %      Carboxyhemoglobin 0.9 %      Comment: 84 Value below reference range        A-a Gradiant 32.8 mmHg      CO2 Content 27.4 mmol/L      Temperature 0.0 C      Barometric Pressure for Blood Gas 724 mmHg      Modality Room Air     FIO2 21 %      Ventilator Mode NA     Note --     Collected by [id number]     Comment: Meter: [id number]     :  [id number]        pH, Temp Corrected --     pCO2, Temperature Corrected --     pO2, Temperature  Corrected --        Imaging Results (Last 24 Hours)     Procedure Component Value Units Date/Time    CT Chest Pulmonary Embolism [[id number]] Collected:  02/03/20 2249     Updated:  02/03/20 2251    Narrative:       CT CHEST WITH CONTRAST, PE PROTOCOL, 2/3/2020    HISTORY:  59-year-old male in the ED with new-onset shortness of air and cough.    TECHNIQUE:  CT examination of the chest with IV contrast. CTA MIP multiplanar pulmonary artery images were reformatted with 3-D postprocessing. Radiation dose reduction techniques included automated exposure control. Radiation audit for CT and nuclear cardiology  exams in the last 12 months: 0.    FINDINGS:  No convincing evidence of pulmonary embolism. Streak artifact over a single posterior subsegmental pulmonary artery in the right lower lung is noted, but this does not convincingly represent intraluminal filling defect. Pulmonary arteries are normal in  caliber. Heart size is normal, and there is no pericardial effusion. Normal caliber thoracic aorta.    Lung image detail is degraded by patient respiratory motion artifact. Central bronchial wall thickening suggests active bronchitis, correlate clinically. No visible pulmonary infiltrate or pleural effusion. No suspicious mass or adenopathy within the  mediastinum or pulmonary partha.    Limited upper abdominal images show no significant abnormality. There is no gastric distention, hiatal hernia or thoracic esophageal dilatation.      Impression:       1. No evidence of pulmonary embolism or other acute vascular abnormality within the chest.  2. Motion limited examination. Likely mild central bronchial wall thickening. This may be associated with active bronchitis. The lungs appear clear.    Signer Name: Chinedu Ortiz MD   Signed: 2/3/2020 10:49 PM   Workstation Name: [id number]    [facility]    XR Chest 1 View [[id number]] Collected:  02/03/20 2007     Updated:  02/03/20 2009    Narrative:        EXAMINATION: XR CHEST 1 VW-      CLINICAL INDICATION:     cough, sob     TECHNIQUE:  XR CHEST 1 VW-      COMPARISON: 06/08/2015      FINDINGS:      Lungs are aerated.   Heart size, mediastinum, and pulmonary vascularity are unremarkable.   No pneumothorax.   No effusions.   No acute osseous findings.          Impression:       No radiographic evidence of acute cardiac or pulmonary  disease.     This report was finalized on 2/3/2020 8:07 PM by Dr. Mina Solitario MD.           Scheduled Meds Sorted by Name   for Vincenzo Fagan as of 2/2/20 through 2/4/20     1 Day 3 Days 7 Days 10 Days < Today >    Legend:                           Discontinued    Completed    Future    MAR Hold     Other Encounter    Linked           Medications 02/02/20 02/03/20 02/04/20   acetaminophen (TYLENOL) tablet 650 mg   Dose: 650 mg  Freq: Once Route: PO  Start: 02/03/20 2134 End: 02/03/20 2143 2143             acetylcysteine (MUCOMYST) 20 % nebulizer solution 3 mL   Dose: 3 mL  Freq: 2 Times Daily - RT Route: NEBULIZATION  Start: 02/04/20 1015    Admin Instructions:   IF GIVING ORALLY: Dilute to a 5% solution in a cup over ice in a clear carbonated beverage with a straw. Use within 1 hour of mixing.  For INHALATION USE ONLY.  NOT for IV use.      1015 2130          atorvastatin (LIPITOR) tablet 20 mg   Dose: 20 mg  Freq: Nightly Route: PO  Start: 02/04/20 2100    Admin Instructions:   Avoid grapefruit juice.      2100            budesonide-formoterol (SYMBICORT) 160-4.5 MCG/ACT inhaler 2 puff   Dose: 2 puff  Freq: 2 Times Daily - RT Route: IN  Start: 02/04/20 1015    Admin Instructions:    Shake well. Rinse mouth after use, do not swallow water. Send aerosols to pharmacy in ziplock bag for proper disposal.      1015 2130          buPROPion SR (WELLBUTRIN SR) 12 hr tablet 150 mg   Dose: 150 mg  Freq: 2 Times Daily Route: PO  Start: 02/04/20 1015    Admin Instructions:   Caution: Look alike/sound alike drug alert. Swallow  whole. Do not crush, chew, or split tablet.      1015   2100          heparin (porcine) 5000 UNIT/ML injection 5,000 Units   Dose: 5,000 Units  Freq: Every 12 Hours Scheduled Route: SC  Indications Comment: Prophylaxis of Venous Thromboembolism  Start: 02/04/20 0900      0826   2100          HYDROcod Polst-CPM Polst ER (TUSSIONEX PENNKINETIC) 10-8 MG/5ML ER suspension 5 mL   Dose: 5 mL  Freq: Once Route: PO  Start: 02/03/20 1914 End: 02/03/20 1941    Admin Instructions:   Do not administer more often than every 12 hours. Shake well.       1941             iopamidol (ISOVUE-370) 76 % injection 100 mL   Dose: 100 mL  Freq: Once in Imaging Route: IV  Start: 02/03/20 2231 End: 02/03/20 2231 2231             ipratropium (ATROVENT) nebulizer solution 0.5 mg   Dose: 0.5 mg  Freq: 4 Times Daily - RT Route: NEBULIZATION  Start: 02/04/20 1230    Admin Instructions:         1230   1630   2030        ipratropium (ATROVENT) nebulizer solution 0.5 mg   Dose: 0.5 mg  Freq: Once Route: NEBULIZATION  Start: 02/03/20 2318 End: 02/03/20 2346    Admin Instructions:        2346             ipratropium (ATROVENT) nebulizer solution 0.5 mg   Dose: 0.5 mg  Freq: Every 6 Hours - RT Route: NEBULIZATION  Start: 02/04/20 0300 End: 02/04/20 0931    Admin Instructions:         0412   0713   0931-D/C'd      ipratropium-albuterol (DUO-NEB) nebulizer solution 3 mL   Dose: 3 mL  Freq: Once Route: NEBULIZATION  Start: 02/03/20 1914 End: 02/03/20 1919 1919             methylPREDNISolone sodium succinate (SOLU-Medrol) injection 125 mg   Dose: 125 mg  Freq: Once Route: IV  Start: 02/03/20 1914 End: 02/03/20 1941    Admin Instructions:   Caution: Look alike/sound alike drug alert     1941             methylPREDNISolone sodium succinate (SOLU-Medrol) injection 40 mg   Dose: 40 mg  Freq: Every 12 Hours Route: IV  Start: 02/04/20 0800    Admin Instructions:   Caution: Look alike/sound alike drug alert      1266   2000          montelukast  (SINGULAIR) tablet 10 mg   Dose: 10 mg  Freq: Nightly Route: PO  Start: 02/04/20 2100 2100            omalizumab (XOLAIR) injection 150 mg   Dose: 150 mg  Freq: Every 14 Days Route: SC  Start: 02/13/20 0900 End: 02/27/20 0859    Admin Instructions:   Administer no more than 150 mg at one injection site.         oseltamivir (TAMIFLU) capsule 75 mg   Dose: 75 mg  Freq: Every 12 Hours Scheduled Route: PO  Indications of Use: INFLUENZA A  Start: 02/04/20 0900 End: 02/08/20 2059 0827 2100          oseltamivir (TAMIFLU) capsule 75 mg   Dose: 75 mg  Freq: Once Route: PO  Indications of Use: INFLUENZA A  Start: 02/03/20 2006 End: 02/03/20 2011 2011             sodium chloride 0.9 % bolus 1,000 mL   Dose: 1,000 mL  Freq: Once Route: IV  Last Dose: Stopped (02/03/20 2308)  Start: 02/03/20 1955 End: 02/03/20 2308 2143 2308           sodium chloride 0.9 % bolus 1,000 mL   Dose: 1,000 mL  Freq: Once Route: IV  Last Dose: Stopped (02/03/20 2309)  Start: 02/03/20 1914 End: 02/03/20 2309 1939 1941 2309         sodium chloride 0.9 % flush 10 mL   Dose: 10 mL  Freq: Every 12 Hours Scheduled Route: IV  Start: 02/04/20 0900 0823 2100          Medications 02/02/20 02/03/20 02/04/20       Continuous Meds Sorted by Name   for Vincenzo Fagan as of 2/2/20 through 2/4/20    Legend:                           Discontinued    Completed    Future    MAR Hold     Other Encounter    Linked           Medications 02/02/20 02/03/20 02/04/20   sodium chloride 0.9 % infusion   Rate: 100 mL/hr Dose: 100 mL/hr  Freq: Continuous Route: IV  Last Dose: 100 mL/hr (02/04/20 0350)  Start: 02/04/20 0300 End: 02/04/20 0920 0350 0920-D/C'd            PRN Meds Sorted by Name   for GraciaYogiVincenzo as of 2/2/20 through 2/4/20    Legend:                           Discontinued    Completed    Future    MAR Hold     Other Encounter    Linked           Medications 02/02/20 02/03/20 02/04/20   benzonatate  (TESSALON) capsule 200 mg   Dose: 200 mg  Freq: 3 Times Daily PRN Route: PO  PRN Reason: Cough  Start: 02/04/20 0927    Admin Instructions:   Swallow whole. Do not crush, chew, or open capsule.         ipratropium-albuterol (DUO-NEB) nebulizer solution 3 mL   Dose: 3 mL  Freq: 4 Times Daily PRN Route: NEBULIZATION  PRN Reasons: Wheezing,Shortness of Air  Start: 02/04/20 0927         levalbuterol (XOPENEX) nebulizer solution 1.25 mg   Dose: 1.25 mg  Freq: Every 6 Hours PRN Route: NEBULIZATION  PRN Reason: Wheezing  Start: 02/03/20 2018 2025             sodium chloride 0.9 % flush 10 mL   Dose: 10 mL  Freq: As Needed Route: IV  PRN Reason: Line Care  Start: 02/04/20 0204         sodium chloride 0.9 % flush 10 mL   Dose: 10 mL  Freq: As Needed Route: IV  PRN Reason: Line Care  Start: 02/03/20 1801      0311            Medications 02/02/20 02/03/20 02/04/20

## 2020-02-04 NOTE — PROGRESS NOTES
Discharge Planning Assessment  [facility]     Patient Name: Vincenzo Fagan  MRN: [id number]  Today's Date: 2/4/2020    Admit Date: 2/3/2020    Discharge Needs Assessment     Row Name 02/04/20 1012       Living Environment    Lives With  spouse    Name(s) of Who Lives With Patient  Spouse, Gale Wheeler.    Current Living Arrangements  home/apartment/condo    Primary Care Provided by  self    Provides Primary Care For  no one    Family Caregiver if Needed  spouse    Family Caregiver Names  Spouse, Gale Wheeler.    Quality of Family Relationships  supportive    Able to Return to Prior Arrangements  yes       Resource/Environmental Concerns    Resource/Environmental Concerns  none    Transportation Concerns  car, none       Transition Planning    Patient/Family Anticipates Transition to  home with family    Patient/Family Anticipated Services at Transition  -- Denies need for Home Health. He drives and is independent.    Transportation Anticipated  family or friend will provide       Discharge Needs Assessment    Readmission Within the Last 30 Days  no previous admission in last 30 days    Concerns to be Addressed  no discharge needs identified;denies needs/concerns at this time Denies outpatient needs at this time.    Equipment Currently Used at Home  nebulizer Nebulizer/Solution provided per [facility].    Anticipated Changes Related to Illness  none    Equipment Needed After Discharge  none    Outpatient/Agency/Support Group Needs  -- Denies outpatient needs. Independent.    Discharge Facility/Level of Care Needs  -- Plans to return home at [place].    Provided post acute provider list?  N/A    N/A Provider List Comment  Established provider-Laureen Bernard.        Discharge Plan     Row Name 02/04/20 1017       Plan    Plan  Patient came to ED from home with shortness of breath and cough. Positive Flu A/COPD/Sepsis. He lives with his wife, Gale Wheeler @ [address] [place] [address] and plans to return home at  discharge. Wife, Gale will provide post discharge support. He has a Nebulizer/Solution provided per [facility]. He denies any home care needs at this time. He is independent and drives. Ambulatory ad rekha.He has no transportation issues to [facility]. His PCP is Laureen Bernard. He states he follows with Pulm-Dr. Marx every 2 weeks for Xolair inj. for his allergies. He has [place] coverage and denies medication copay concerns. He utilizes [facility]. He is in Flu isolation precautions. COPD edu has been consulted. He has a T/F order to [facility]. Alert and oriented. CM will follow and assist as needed.    Patient/Family in Agreement with Plan  yes        Destination      Coordination has not been started for this encounter.      Durable Medical Equipment      Coordination has not been started for this encounter.      Dialysis/Infusion      Coordination has not been started for this encounter.      Home Medical Care      Coordination has not been started for this encounter.      Therapy      Coordination has not been started for this encounter.      Community Resources      Coordination has not been started for this encounter.          Demographic Summary     Row Name 02/04/20 1011       General Information    Admission Type  inpatient    Arrived From  home    Referral Source  admission list    Reason for Consult  discharge planning    Preferred Language  English     Used During This Interaction  no        Functional Status     Row Name 02/04/20 1011       Functional Status, IADL    IADL Comments  Spouse assists at home with activities as needed.       Mental Status    General Appearance WDL  WDL       Mental Status Summary    Mental Status Comments  Alert and oriented.        Psychosocial    No documentation.       Abuse/Neglect    No documentation.       Legal    No documentation.       Substance Abuse    No documentation.       Patient Forms    No documentation.            Jade Hernandez, RN

## 2020-02-04 NOTE — PLAN OF CARE
Problem: Patient Care Overview  Goal: Plan of Care Review  Outcome: Ongoing (interventions implemented as appropriate)  Flowsheets (Taken 2/4/2020 8964)  Progress: no change  Plan of Care Reviewed With: patient; spouse  Goal: Individualization and Mutuality  Outcome: Ongoing (interventions implemented as appropriate)  Goal: Discharge Needs Assessment  Outcome: Ongoing (interventions implemented as appropriate)  Flowsheets (Taken 2/4/2020 7456)  Equipment Currently Used at Home: none  Transportation Anticipated: car, drives self  Patient/Family Anticipated Services at Transition: none  Patient/Family Anticipates Transition to: home  Goal: Interprofessional Rounds/Family Conf  Outcome: Ongoing (interventions implemented as appropriate)

## 2020-02-04 NOTE — PROGRESS NOTES
Subjective     History:   Vincenzo Fagan is a 59 y.o. male admitted on 2/3/2020 secondary to <principal problem not specified>     Procedures: None    CC: Follow up sepsis, acute hypoxic resp failure    Patient seen and examined with CADY Luciano. Awake and alert. No family present at bedside during my encounter. States he feels better today with improvement in his dyspnea. Reports productive cough. No reported CP. No reported nausea or vomiting. No acute events overnight per RN.     History taken from: patient, chart, and RN.      Objective     Vital Signs  Temp:  [98 °F (36.7 °C)-99.1 °F (37.3 °C)] 98 °F (36.7 °C)  Heart Rate:  [104-135] 104  Resp:  [18-48] 20  BP: (122-181)/(81-95) 147/95    Intake/Output Summary (Last 24 hours) at 2/4/2020 1532  Last data filed at 2/4/2020 0900  Gross per 24 hour   Intake 2950 ml   Output 1350 ml   Net 1600 ml         Physical Exam:  General:    Awake, alert, in no acute distress   Heart:      Normal S1 and S2. Tachycardic. No significant murmur, rubs or gallops appreciated.   Lungs:     Respirations regular, even and unlabored. Expiratory wheezes throughout with decreased breath sounds at bases.    Abdomen:   Soft and nontender. No guarding, rebound tenderness or  organomegaly noted. Bowel sounds present x 4.   Extremities:  No clubbing, cyanosis or edema noted. Moves UE and LE equally B/L.     Results Review:    Results from last 7 days   Lab Units 02/04/20 0227 02/03/20 1932   WBC 10*3/mm3 10.15 12.61*   HEMOGLOBIN g/dL 14.2 15.5   PLATELETS 10*3/mm3 249 294     Results from last 7 days   Lab Units 02/04/20 0227 02/03/20  1932   SODIUM mmol/L 134* 136   POTASSIUM mmol/L 4.2 4.3   CHLORIDE mmol/L 101 98   CO2 mmol/L 22.9 23.2   BUN mg/dL 11 11   CREATININE mg/dL 0.99 1.15   CALCIUM mg/dL 8.8 9.7   GLUCOSE mg/dL 143* 131*     Results from last 7 days   Lab Units 02/04/20 0227 02/03/20 1932   BILIRUBIN mg/dL 0.3 0.3   ALK PHOS U/L 67 77   AST (SGOT) U/L 14 16   ALT  (SGPT) U/L 20 20     Results from last 7 days   Lab Units 02/03/20  1932   MAGNESIUM mg/dL 2.0     Results from last 7 days   Lab Units 02/03/20  1932   INR  0.94     Results from last 7 days   Lab Units 02/03/20  1932   TROPONIN T ng/mL <0.010       Imaging Results (Last 24 Hours)     Procedure Component Value Units Date/Time    CT Chest Pulmonary Embolism [[id number]] Collected:  02/03/20 2249     Updated:  02/03/20 2251    Narrative:       CT CHEST WITH CONTRAST, PE PROTOCOL, 2/3/2020    HISTORY:  59-year-old male in the ED with new-onset shortness of air and cough.    TECHNIQUE:  CT examination of the chest with IV contrast. CTA MIP multiplanar pulmonary artery images were reformatted with 3-D postprocessing. Radiation dose reduction techniques included automated exposure control. Radiation audit for CT and nuclear cardiology  exams in the last 12 months: 0.    FINDINGS:  No convincing evidence of pulmonary embolism. Streak artifact over a single posterior subsegmental pulmonary artery in the right lower lung is noted, but this does not convincingly represent intraluminal filling defect. Pulmonary arteries are normal in  caliber. Heart size is normal, and there is no pericardial effusion. Normal caliber thoracic aorta.    Lung image detail is degraded by patient respiratory motion artifact. Central bronchial wall thickening suggests active bronchitis, correlate clinically. No visible pulmonary infiltrate or pleural effusion. No suspicious mass or adenopathy within the  mediastinum or pulmonary partha.    Limited upper abdominal images show no significant abnormality. There is no gastric distention, hiatal hernia or thoracic esophageal dilatation.      Impression:       1. No evidence of pulmonary embolism or other acute vascular abnormality within the chest.  2. Motion limited examination. Likely mild central bronchial wall thickening. This may be associated with active bronchitis. The lungs appear clear.    [place]  Name: Chinedu Ortiz MD   Signed: 2/3/2020 10:49 PM   Workstation Name: [id number]    [facility]    XR Chest 1 View [[id number]] Collected:  02/03/20 2007     Updated:  02/03/20 2009    Narrative:       EXAMINATION: XR CHEST 1 VW-      CLINICAL INDICATION:     cough, sob     TECHNIQUE:  XR CHEST 1 VW-      COMPARISON: 06/08/2015      FINDINGS:      Lungs are aerated.   Heart size, mediastinum, and pulmonary vascularity are unremarkable.   No pneumothorax.   No effusions.   No acute osseous findings.          Impression:       No radiographic evidence of acute cardiac or pulmonary  disease.     This report was finalized on 2/3/2020 8:07 PM by Dr. Mina Solitario MD.               Medications:    acetylcysteine 3 mL Nebulization BID - RT   atorvastatin 20 mg Oral Nightly   budesonide-formoterol 2 puff Inhalation BID - RT   buPROPion  mg Oral BID   heparin (porcine) 5,000 Units Subcutaneous Q12H   ipratropium 0.5 mg Nebulization 4x Daily - RT   lactobacillus acidophilus 1 capsule Oral Daily   methylPREDNISolone sodium succinate 40 mg Intravenous Q12H   montelukast 10 mg Oral Nightly   [START ON 2/13/2020] omalizumab 150 mg Subcutaneous Q14 Days   oseltamivir 75 mg Oral Q12H   sodium chloride 10 mL Intravenous Q12H              Assessment/Plan   Severe sepsis: Likely 2/2 acute influenza A. Afebrile. Tachycardia has improved. Lactate has normalized. WBC improved today. CRP has risen slightly. Cont Tamiflu. Stop IV fluids. Follow cultures and repeat labs in the AM.     Acute exacerbation of COPD: Cont treatment of influenza, steroids and nebs.     Acute hypoxic respiratory failure: Likely 2/2 above. Cont treatment as outlined above. Attempt to wean supplemental O2.    Tobacco abuse: Pt declining Nicotine patch at present. Encouraged cessation.     Hyperlipidemia: Cont statin.     DVT PPX: SQ heparin     Transfer to telemetry floor.        Willian Montesinos DO  02/04/20  3:32 PM

## 2020-02-04 NOTE — ED PROVIDER NOTES
Subjective   59-year-old male who presents to the ED today for shortness of breath.  He states he has a history of COPD.  He has had worsening shortness of breath over the last 3 days.  He states he has had a cough that has been productive of clear, thick sputum.  He denies any fever.  He states he has a headache from coughing so much.  He has tried Tessalon Perles, his regular inhalers and breathing treatments at home with no relief.  He states he is also on daily prednisone.  He does smoke half a pack of cigarettes a day.  He denies any chest pain at this time.      History provided by:  Patient  Shortness of Breath   Severity:  Moderate  Onset quality:  Gradual  Duration:  3 days  Timing:  Constant  Progression:  Worsening  Chronicity:  New  Relieved by:  Nothing  Worsened by:  Coughing  Ineffective treatments:  Inhaler  Associated symptoms: cough, headaches, sputum production and wheezing    Associated symptoms: no abdominal pain, no chest pain, no claudication, no diaphoresis, no ear pain, no fever, no hemoptysis, no neck pain, no PND, no rash, no sore throat, no syncope, no swollen glands and no vomiting    Risk factors: tobacco use        Review of Systems   Constitutional: Negative for diaphoresis and fever.   HENT: Negative for ear pain and sore throat.    Eyes: Negative.    Respiratory: Positive for cough, sputum production, shortness of breath and wheezing. Negative for hemoptysis.    Cardiovascular: Negative for chest pain, claudication, syncope and PND.   Gastrointestinal: Negative for abdominal pain and vomiting.   Genitourinary: Negative.    Musculoskeletal: Negative for neck pain.   Skin: Negative for rash.   Neurological: Positive for headaches.   Psychiatric/Behavioral: Negative.    All other systems reviewed and are negative.      Past Medical History:   Diagnosis Date   • Asthma    • COPD (chronic obstructive pulmonary disease) (CMS/[facility])    • Multiple allergies        No Known Allergies    No  past surgical history on file.    Family History   Problem Relation Age of Onset   • COPD Mother    • Diabetes Mother    • Lung cancer Father        Social History     Socioeconomic History   • Marital status:      Spouse name: Not on file   • Number of children: Not on file   • Years of education: Not on file   • Highest education level: Not on file   Tobacco Use   • Smoking status: Current Some Day Smoker     Types: Cigarettes   • Smokeless tobacco: Never Used   Substance and Sexual Activity   • Alcohol use: Yes     Alcohol/week: 2.0 standard drinks     Types: 2 Cans of beer per week     Comment: Social/ with a meal    • Drug use: Never   • Sexual activity: Defer           Objective   Physical Exam   Constitutional: He is oriented to person, place, and time. He appears well-developed and well-nourished. No distress.   HENT:   Head: Normocephalic and atraumatic.   Mouth/Throat: Oropharynx is clear and moist.   Eyes: Pupils are equal, round, and reactive to light. EOM are normal.   Neck: Normal range of motion. Neck supple. No JVD present. No tracheal deviation present.   Cardiovascular: Regular rhythm, normal heart sounds and intact distal pulses. Tachycardia present.   Pulmonary/Chest: Tachypnea noted. He has wheezes (diffusely).   Very frequent cough   Abdominal: Soft. Bowel sounds are normal.   Musculoskeletal: Normal range of motion.        Right lower leg: Normal.        Left lower leg: Normal.   Neurological: He is alert and oriented to person, place, and time.   Skin: Skin is warm and dry. Capillary refill takes less than 2 seconds.   Psychiatric: He has a normal mood and affect. His behavior is normal.   Nursing note and vitals reviewed.      Procedures           ED Course  ED Course as of Feb 03 2315 Mon Feb 03, 2020   1913 EKG performed at 17:59  Sinus tachycardia, rate of 132  No acute ischemia, reviewed by Dr. Pandey    []   2004 Endorsed to Mar Swanson    []   2133 Discussed with   Ar, will order CT and repeat ABG  I reassessed patient who says he feels much better    [KK]      ED Course User Index  [AH] Klarissa Thakur PA  [KK] Mar Swanson, APRN                                               MDM  Number of Diagnoses or Management Options  Hypoxia: new and requires workup  Influenza A: new and requires workup     Amount and/or Complexity of Data Reviewed  Clinical lab tests: reviewed  Tests in the radiology section of CPT®: reviewed and ordered  Tests in the medicine section of CPT®: reviewed    Risk of Complications, Morbidity, and/or Mortality  Presenting problems: moderate  Diagnostic procedures: moderate  Management options: moderate  General comments: Symptoms improved in ED  Will be admitted to hospital for further evaluation and treatment    Patient Progress  Patient progress: improved      Final diagnoses:   Influenza A   Hypoxia            Mar Swanson, СЕРГЕЙ  02/04/20 0424

## 2020-02-04 NOTE — PLAN OF CARE
Problem: Patient Care Overview  Goal: Plan of Care Review  Outcome: Ongoing (interventions implemented as appropriate)  Flowsheets (Taken 2/4/2020 0455 by Griselda Croft, RN)  Progress: no change  Plan of Care Reviewed With: patient;spouse  Goal: Individualization and Mutuality  Outcome: Ongoing (interventions implemented as appropriate)  Goal: Discharge Needs Assessment  Outcome: Ongoing (interventions implemented as appropriate)  Flowsheets  Taken 2/4/2020 0455 by Griselda Croft, RN  Equipment Currently Used at Home: none  Transportation Anticipated: car, drives self  Patient/Family Anticipated Services at Transition: none  Patient/Family Anticipates Transition to: home  Taken 2/4/2020 0526 by Summer Hu RN  Readmission Within the Last 30 Days: no previous admission in last 30 days  Goal: Interprofessional Rounds/Family Conf  Outcome: Ongoing (interventions implemented as appropriate)     Problem: Fall Risk (Adult)  Goal: Identify Related Risk Factors and Signs and Symptoms  Outcome: Ongoing (interventions implemented as appropriate)  Flowsheets (Taken 2/4/2020 0526)  Related Risk Factors (Fall Risk): environment unfamiliar  Signs and Symptoms (Fall Risk): presence of risk factors  Goal: Absence of Fall  Outcome: Ongoing (interventions implemented as appropriate)  Flowsheets (Taken 2/4/2020 0526)  Absence of Fall: making progress toward outcome     Problem: Breathing Pattern Ineffective (Adult)  Goal: Identify Related Risk Factors and Signs and Symptoms  Outcome: Ongoing (interventions implemented as appropriate)  Flowsheets (Taken 2/4/2020 0526)  Related Risk Factors (Breathing Pattern Ineffective): infection  Signs and Symptoms (Breathing Pattern Ineffective): breathlessness; breathing pattern altered; activity intolerance  Goal: Effective Oxygenation/Ventilation  Outcome: Ongoing (interventions implemented as appropriate)  Flowsheets (Taken 2/4/2020 0526)  Effective Oxygenation/Ventilation: making  progress toward outcome  Goal: Anxiety/Fear Reduction  Outcome: Ongoing (interventions implemented as appropriate)     Problem: Infection, Risk/Actual (Adult)  Goal: Identify Related Risk Factors and Signs and Symptoms  Outcome: Ongoing (interventions implemented as appropriate)  Flowsheets (Taken 2/4/2020 0526)  Signs and Symptoms (Infection, Risk/Actual): body temperature changes  Goal: Infection Prevention/Resolution  Outcome: Ongoing (interventions implemented as appropriate)  Flowsheets (Taken 2/4/2020 0526)  Infection Prevention/Resolution: making progress toward outcome

## 2020-02-05 LAB
ANION GAP SERPL CALCULATED.3IONS-SCNC: 12.4 MMOL/L (ref 5–15)
BUN BLD-MCNC: 19 MG/DL (ref 6–20)
BUN/CREAT SERPL: 22.1 (ref 7–25)
CALCIUM SPEC-SCNC: 9.2 MG/DL (ref 8.6–10.5)
CHLORIDE SERPL-SCNC: 100 MMOL/L (ref 98–107)
CO2 SERPL-SCNC: 23.6 MMOL/L (ref 22–29)
CREAT BLD-MCNC: 0.86 MG/DL (ref 0.76–1.27)
CRP SERPL-MCNC: 1.11 MG/DL (ref 0–0.5)
DEPRECATED RDW RBC AUTO: 43.3 FL (ref 37–54)
EOSINOPHIL # BLD MANUAL: 0.1 10*3/MM3 (ref 0–0.4)
EOSINOPHIL NFR BLD MANUAL: 1 % (ref 0.3–6.2)
ERYTHROCYTE [DISTWIDTH] IN BLOOD BY AUTOMATED COUNT: 12.7 % (ref 12.3–15.4)
GFR SERPL CREATININE-BSD FRML MDRD: 91 ML/MIN/1.73
GLUCOSE BLD-MCNC: 121 MG/DL (ref 65–99)
HCT VFR BLD AUTO: 47.3 % (ref 37.5–51)
HGB BLD-MCNC: 14.6 G/DL (ref 13–17.7)
HYPOCHROMIA BLD QL: ABNORMAL
LYMPHOCYTES # BLD MANUAL: 0.41 10*3/MM3 (ref 0.7–3.1)
LYMPHOCYTES NFR BLD MANUAL: 4 % (ref 19.6–45.3)
LYMPHOCYTES NFR BLD MANUAL: 4 % (ref 5–12)
MCH RBC QN AUTO: 28.5 PG (ref 26.6–33)
MCHC RBC AUTO-ENTMCNC: 30.9 G/DL (ref 31.5–35.7)
MCV RBC AUTO: 92.2 FL (ref 79–97)
MONOCYTES # BLD AUTO: 0.41 10*3/MM3 (ref 0.1–0.9)
NEUTROPHILS # BLD AUTO: 9.35 10*3/MM3 (ref 1.7–7)
NEUTROPHILS NFR BLD MANUAL: 90 % (ref 42.7–76)
NEUTS BAND NFR BLD MANUAL: 1 % (ref 0–5)
PLATELET # BLD AUTO: 268 10*3/MM3 (ref 140–450)
PMV BLD AUTO: 10 FL (ref 6–12)
POTASSIUM BLD-SCNC: 4.2 MMOL/L (ref 3.5–5.2)
RBC # BLD AUTO: 5.13 10*6/MM3 (ref 4.14–5.8)
SCAN SLIDE: NORMAL
SMALL PLATELETS BLD QL SMEAR: ADEQUATE
SODIUM BLD-SCNC: 136 MMOL/L (ref 136–145)
WBC NRBC COR # BLD: 10.28 10*3/MM3 (ref 3.4–10.8)

## 2020-02-05 PROCEDURE — 85007 BL SMEAR W/DIFF WBC COUNT: CPT | Performed by: INTERNAL MEDICINE

## 2020-02-05 PROCEDURE — 86140 C-REACTIVE PROTEIN: CPT | Performed by: INTERNAL MEDICINE

## 2020-02-05 PROCEDURE — 25010000002 METHYLPREDNISOLONE PER 40 MG: Performed by: INTERNAL MEDICINE

## 2020-02-05 PROCEDURE — 99232 SBSQ HOSP IP/OBS MODERATE 35: CPT | Performed by: INTERNAL MEDICINE

## 2020-02-05 PROCEDURE — 85025 COMPLETE CBC W/AUTO DIFF WBC: CPT | Performed by: INTERNAL MEDICINE

## 2020-02-05 PROCEDURE — 25010000002 HEPARIN (PORCINE) PER 1000 UNITS: Performed by: INTERNAL MEDICINE

## 2020-02-05 PROCEDURE — 94799 UNLISTED PULMONARY SVC/PX: CPT

## 2020-02-05 PROCEDURE — 80048 BASIC METABOLIC PNL TOTAL CA: CPT | Performed by: INTERNAL MEDICINE

## 2020-02-05 PROCEDURE — 25010000002 FUROSEMIDE PER 20 MG: Performed by: INTERNAL MEDICINE

## 2020-02-05 RX ORDER — FUROSEMIDE 10 MG/ML
40 INJECTION INTRAMUSCULAR; INTRAVENOUS ONCE
Status: COMPLETED | OUTPATIENT
Start: 2020-02-05 | End: 2020-02-05

## 2020-02-05 RX ORDER — GUAIFENESIN 600 MG/1
1200 TABLET, EXTENDED RELEASE ORAL EVERY 12 HOURS SCHEDULED
Status: DISCONTINUED | OUTPATIENT
Start: 2020-02-05 | End: 2020-02-06 | Stop reason: HOSPADM

## 2020-02-05 RX ADMIN — IPRATROPIUM BROMIDE 0.5 MG: 0.5 SOLUTION RESPIRATORY (INHALATION) at 07:04

## 2020-02-05 RX ADMIN — ACETYLCYSTEINE 4 ML: 200 SOLUTION ORAL; RESPIRATORY (INHALATION) at 18:40

## 2020-02-05 RX ADMIN — HEPARIN SODIUM 5000 UNITS: 5000 INJECTION INTRAVENOUS; SUBCUTANEOUS at 21:03

## 2020-02-05 RX ADMIN — HEPARIN SODIUM 5000 UNITS: 5000 INJECTION INTRAVENOUS; SUBCUTANEOUS at 08:30

## 2020-02-05 RX ADMIN — IPRATROPIUM BROMIDE 0.5 MG: 0.5 SOLUTION RESPIRATORY (INHALATION) at 18:39

## 2020-02-05 RX ADMIN — OSELTAMIVIR PHOSPHATE 75 MG: 75 CAPSULE ORAL at 21:12

## 2020-02-05 RX ADMIN — ATORVASTATIN CALCIUM 20 MG: 20 TABLET, FILM COATED ORAL at 21:02

## 2020-02-05 RX ADMIN — ACETYLCYSTEINE 3 ML: 200 SOLUTION ORAL; RESPIRATORY (INHALATION) at 07:04

## 2020-02-05 RX ADMIN — BUPROPION HYDROCHLORIDE 150 MG: 150 TABLET, FILM COATED, EXTENDED RELEASE ORAL at 08:30

## 2020-02-05 RX ADMIN — OSELTAMIVIR PHOSPHATE 75 MG: 75 CAPSULE ORAL at 08:30

## 2020-02-05 RX ADMIN — METHYLPREDNISOLONE SODIUM SUCCINATE 40 MG: 40 INJECTION, POWDER, FOR SOLUTION INTRAMUSCULAR; INTRAVENOUS at 20:56

## 2020-02-05 RX ADMIN — IPRATROPIUM BROMIDE 0.5 MG: 0.5 SOLUTION RESPIRATORY (INHALATION) at 00:01

## 2020-02-05 RX ADMIN — BUDESONIDE AND FORMOTEROL FUMARATE DIHYDRATE 2 PUFF: 160; 4.5 AEROSOL RESPIRATORY (INHALATION) at 07:05

## 2020-02-05 RX ADMIN — Medication 1 CAPSULE: at 08:30

## 2020-02-05 RX ADMIN — METHYLPREDNISOLONE SODIUM SUCCINATE 40 MG: 40 INJECTION, POWDER, FOR SOLUTION INTRAMUSCULAR; INTRAVENOUS at 08:30

## 2020-02-05 RX ADMIN — BUDESONIDE AND FORMOTEROL FUMARATE DIHYDRATE 2 PUFF: 160; 4.5 AEROSOL RESPIRATORY (INHALATION) at 18:39

## 2020-02-05 RX ADMIN — SODIUM CHLORIDE, PRESERVATIVE FREE 10 ML: 5 INJECTION INTRAVENOUS at 08:30

## 2020-02-05 RX ADMIN — MONTELUKAST SODIUM 10 MG: 10 TABLET, COATED ORAL at 21:02

## 2020-02-05 RX ADMIN — GUAIFENESIN 1200 MG: 600 TABLET, EXTENDED RELEASE ORAL at 21:02

## 2020-02-05 RX ADMIN — GUAIFENESIN 1200 MG: 600 TABLET, EXTENDED RELEASE ORAL at 14:07

## 2020-02-05 RX ADMIN — SODIUM CHLORIDE, PRESERVATIVE FREE 10 ML: 5 INJECTION INTRAVENOUS at 20:58

## 2020-02-05 RX ADMIN — FUROSEMIDE 40 MG: 10 INJECTION, SOLUTION INTRAMUSCULAR; INTRAVENOUS at 13:11

## 2020-02-05 RX ADMIN — BUPROPION HYDROCHLORIDE 150 MG: 150 TABLET, FILM COATED, EXTENDED RELEASE ORAL at 21:02

## 2020-02-05 NOTE — PROGRESS NOTES
Continued Stay Note   [facility]     Patient Name: Vincenzo Fagan  MRN: [id number]  Today's Date: 2/5/2020    Admit Date: 2/3/2020    Discharge Plan     Row Name 02/05/20 1229       Plan    Plan  Transferred to Room 323 on 2/4/20. CM will follow and assist as needed.        Discharge Codes    No documentation.             Jade Hernandez RN

## 2020-02-05 NOTE — PLAN OF CARE
Problem: Patient Care Overview  Goal: Plan of Care Review  Outcome: Ongoing (interventions implemented as appropriate)   Patient doing well, states he feels better.  Still has wheezing to bilat lower lung with productive cough.  Still requiring 1.5 LNC to maintain saturation greater than 90%. Will continue to monitor and follow plan of care.

## 2020-02-06 VITALS
HEIGHT: 70 IN | TEMPERATURE: 97.9 F | BODY MASS INDEX: 28.09 KG/M2 | RESPIRATION RATE: 18 BRPM | SYSTOLIC BLOOD PRESSURE: 142 MMHG | HEART RATE: 92 BPM | DIASTOLIC BLOOD PRESSURE: 82 MMHG | WEIGHT: 196.2 LBS | OXYGEN SATURATION: 94 %

## 2020-02-06 LAB
ANION GAP SERPL CALCULATED.3IONS-SCNC: 12.8 MMOL/L (ref 5–15)
BASOPHILS # BLD AUTO: 0.01 10*3/MM3 (ref 0–0.2)
BASOPHILS NFR BLD AUTO: 0.1 % (ref 0–1.5)
BUN BLD-MCNC: 22 MG/DL (ref 6–20)
BUN/CREAT SERPL: 22 (ref 7–25)
CALCIUM SPEC-SCNC: 8.8 MG/DL (ref 8.6–10.5)
CHLORIDE SERPL-SCNC: 100 MMOL/L (ref 98–107)
CO2 SERPL-SCNC: 23.2 MMOL/L (ref 22–29)
CREAT BLD-MCNC: 1 MG/DL (ref 0.76–1.27)
CRP SERPL-MCNC: 0.32 MG/DL (ref 0–0.5)
DEPRECATED RDW RBC AUTO: 41.4 FL (ref 37–54)
EOSINOPHIL # BLD AUTO: 0 10*3/MM3 (ref 0–0.4)
EOSINOPHIL NFR BLD AUTO: 0 % (ref 0.3–6.2)
ERYTHROCYTE [DISTWIDTH] IN BLOOD BY AUTOMATED COUNT: 12.5 % (ref 12.3–15.4)
GFR SERPL CREATININE-BSD FRML MDRD: 77 ML/MIN/1.73
GLUCOSE BLD-MCNC: 160 MG/DL (ref 65–99)
HCT VFR BLD AUTO: 47.7 % (ref 37.5–51)
HGB BLD-MCNC: 15.2 G/DL (ref 13–17.7)
IMM GRANULOCYTES # BLD AUTO: 0.04 10*3/MM3 (ref 0–0.05)
IMM GRANULOCYTES NFR BLD AUTO: 0.4 % (ref 0–0.5)
LYMPHOCYTES # BLD AUTO: 0.69 10*3/MM3 (ref 0.7–3.1)
LYMPHOCYTES NFR BLD AUTO: 6.8 % (ref 19.6–45.3)
MCH RBC QN AUTO: 28.6 PG (ref 26.6–33)
MCHC RBC AUTO-ENTMCNC: 31.9 G/DL (ref 31.5–35.7)
MCV RBC AUTO: 89.7 FL (ref 79–97)
MONOCYTES # BLD AUTO: 0.46 10*3/MM3 (ref 0.1–0.9)
MONOCYTES NFR BLD AUTO: 4.5 % (ref 5–12)
NEUTROPHILS # BLD AUTO: 8.96 10*3/MM3 (ref 1.7–7)
NEUTROPHILS NFR BLD AUTO: 88.2 % (ref 42.7–76)
NRBC BLD AUTO-RTO: 0 /100 WBC (ref 0–0.2)
PLATELET # BLD AUTO: 243 10*3/MM3 (ref 140–450)
PMV BLD AUTO: 10.2 FL (ref 6–12)
POTASSIUM BLD-SCNC: 4.3 MMOL/L (ref 3.5–5.2)
RBC # BLD AUTO: 5.32 10*6/MM3 (ref 4.14–5.8)
SODIUM BLD-SCNC: 136 MMOL/L (ref 136–145)
WBC NRBC COR # BLD: 10.16 10*3/MM3 (ref 3.4–10.8)

## 2020-02-06 PROCEDURE — 86140 C-REACTIVE PROTEIN: CPT | Performed by: INTERNAL MEDICINE

## 2020-02-06 PROCEDURE — 94799 UNLISTED PULMONARY SVC/PX: CPT

## 2020-02-06 PROCEDURE — 85025 COMPLETE CBC W/AUTO DIFF WBC: CPT | Performed by: INTERNAL MEDICINE

## 2020-02-06 PROCEDURE — 25010000002 HEPARIN (PORCINE) PER 1000 UNITS: Performed by: INTERNAL MEDICINE

## 2020-02-06 PROCEDURE — 99239 HOSP IP/OBS DSCHRG MGMT >30: CPT | Performed by: INTERNAL MEDICINE

## 2020-02-06 PROCEDURE — 25010000002 METHYLPREDNISOLONE PER 40 MG: Performed by: INTERNAL MEDICINE

## 2020-02-06 PROCEDURE — 80048 BASIC METABOLIC PNL TOTAL CA: CPT | Performed by: INTERNAL MEDICINE

## 2020-02-06 RX ORDER — OSELTAMIVIR PHOSPHATE 75 MG/1
75 CAPSULE ORAL EVERY 12 HOURS SCHEDULED
Qty: 5 CAPSULE | Refills: 0 | Status: SHIPPED | OUTPATIENT
Start: 2020-02-06 | End: 2020-02-09

## 2020-02-06 RX ORDER — PREDNISONE 20 MG/1
40 TABLET ORAL
Qty: 10 TABLET | Refills: 0 | Status: SHIPPED | OUTPATIENT
Start: 2020-02-06 | End: 2020-02-12

## 2020-02-06 RX ORDER — PREDNISONE 20 MG/1
40 TABLET ORAL
Status: DISCONTINUED | OUTPATIENT
Start: 2020-02-06 | End: 2020-02-06 | Stop reason: HOSPADM

## 2020-02-06 RX ADMIN — GUAIFENESIN 1200 MG: 600 TABLET, EXTENDED RELEASE ORAL at 09:55

## 2020-02-06 RX ADMIN — OSELTAMIVIR PHOSPHATE 75 MG: 75 CAPSULE ORAL at 09:55

## 2020-02-06 RX ADMIN — IPRATROPIUM BROMIDE 0.5 MG: 0.5 SOLUTION RESPIRATORY (INHALATION) at 07:25

## 2020-02-06 RX ADMIN — HEPARIN SODIUM 5000 UNITS: 5000 INJECTION INTRAVENOUS; SUBCUTANEOUS at 09:55

## 2020-02-06 RX ADMIN — METHYLPREDNISOLONE SODIUM SUCCINATE 40 MG: 40 INJECTION, POWDER, FOR SOLUTION INTRAMUSCULAR; INTRAVENOUS at 08:30

## 2020-02-06 RX ADMIN — ACETYLCYSTEINE 3 ML: 200 SOLUTION ORAL; RESPIRATORY (INHALATION) at 07:25

## 2020-02-06 RX ADMIN — SODIUM CHLORIDE, PRESERVATIVE FREE 10 ML: 5 INJECTION INTRAVENOUS at 09:56

## 2020-02-06 RX ADMIN — Medication 1 CAPSULE: at 09:55

## 2020-02-06 RX ADMIN — BUPROPION HYDROCHLORIDE 150 MG: 150 TABLET, FILM COATED, EXTENDED RELEASE ORAL at 09:55

## 2020-02-06 RX ADMIN — BUDESONIDE AND FORMOTEROL FUMARATE DIHYDRATE 2 PUFF: 160; 4.5 AEROSOL RESPIRATORY (INHALATION) at 07:41

## 2020-02-06 NOTE — PROGRESS NOTES
Subjective     History:   Vincenzo Fagan is a 59 y.o. male admitted on 2/3/2020 secondary to <principal problem not specified>     Procedures: None    CC: Follow up sepsis, acute hypoxic resp failure    Patient seen and examined with CADY Ragland. Awake and alert. No family present at bedside during my encounter. States he feels better again today with improvement in his cough and dyspnea. Denies CP or palpitations. No reported nausea or vomiting. Still requiring supplemental O2. No acute events overnight per RN.     History taken from: patient, chart, and RN.      Objective     Vital Signs  Temp:  [97.7 °F (36.5 °C)-98.3 °F (36.8 °C)] 98 °F (36.7 °C)  Heart Rate:  [] 109  Resp:  [20-24] 20  BP: (133-151)/(88-97) 143/88    Intake/Output Summary (Last 24 hours) at 2/5/2020 2003  Last data filed at 2/5/2020 1700  Gross per 24 hour   Intake 1540 ml   Output 1450 ml   Net 90 ml         Physical Exam:  General:    Awake, alert, in no acute distress   Heart:      Normal S1 and S2. Mildly tachycardic. No significant murmur, rubs or gallops appreciated.   Lungs:     Respirations regular, even and unlabored. Scattered expiratory wheezes but much improved from yesterday.    Abdomen:   Soft and nontender. No guarding, rebound tenderness or  organomegaly noted. Bowel sounds present x 4.   Extremities:  No clubbing, cyanosis or edema noted. Moves UE and LE equally B/L.     Results Review:    Results from last 7 days   Lab Units 02/05/20  0505 02/04/20 0227 02/03/20 1932   WBC 10*3/mm3 10.28 10.15 12.61*   HEMOGLOBIN g/dL 14.6 14.2 15.5   PLATELETS 10*3/mm3 268 249 294     Results from last 7 days   Lab Units 02/05/20  0505 02/04/20 0227 02/03/20 1932   SODIUM mmol/L 136 134* 136   POTASSIUM mmol/L 4.2 4.2 4.3   CHLORIDE mmol/L 100 101 98   CO2 mmol/L 23.6 22.9 23.2   BUN mg/dL 19 11 11   CREATININE mg/dL 0.86 0.99 1.15   CALCIUM mg/dL 9.2 8.8 9.7   GLUCOSE mg/dL 121* 143* 131*     Results from last 7 days   Lab  Units 02/04/20  0227 02/03/20 1932   BILIRUBIN mg/dL 0.3 0.3   ALK PHOS U/L 67 77   AST (SGOT) U/L 14 16   ALT (SGPT) U/L 20 20     Results from last 7 days   Lab Units 02/03/20  1932   MAGNESIUM mg/dL 2.0     Results from last 7 days   Lab Units 02/03/20  1932   INR  0.94     Results from last 7 days   Lab Units 02/03/20 1932   TROPONIN T ng/mL <0.010       Imaging Results (Last 24 Hours)     ** No results found for the last 24 hours. **            Medications:    acetylcysteine 3 mL Nebulization BID - RT   atorvastatin 20 mg Oral Nightly   budesonide-formoterol 2 puff Inhalation BID - RT   buPROPion  mg Oral BID   guaiFENesin 1,200 mg Oral Q12H   heparin (porcine) 5,000 Units Subcutaneous Q12H   lactobacillus acidophilus 1 capsule Oral Daily   methylPREDNISolone sodium succinate 40 mg Intravenous Q12H   montelukast 10 mg Oral Nightly   [START ON 2/13/2020] omalizumab 150 mg Subcutaneous Q14 Days   oseltamivir 75 mg Oral Q12H   sodium chloride 10 mL Intravenous Q12H              Assessment/Plan   Severe sepsis: Likely 2/2 acute influenza A. Afebrile. Tachycardia overall improved. Lactate has normalized. WBC improved and stable today. CRP improved. Cont Tamiflu. Follow cultures and repeat labs in the AM.     Acute exacerbation of COPD: Cont treatment of influenza, steroids and nebs. Order a dose of Lasix to assist with lung compliance.     Acute hypoxic respiratory failure: Likely 2/2 above. Cont treatment as outlined above. Attempt to wean supplemental O2.    Tobacco abuse: Pt declining Nicotine patch at present. Cont to encourage cessation.     Hyperlipidemia: Cont statin.     DVT PPX: SQ heparin           Willian Montesinos DO  02/05/20  8:03 PM

## 2020-02-06 NOTE — PLAN OF CARE
"Patient resting well. Patient expressed \"he is ready to be home\". No distress noted. Will follow plan of care and continue to monitor.   "

## 2020-02-06 NOTE — PLAN OF CARE
Patient reports feeling much better today and is hoping to go home. He was able to ambulate 250 feet in the hallway on room air and maintained oxygen saturation level at 90% or greater with no complaints of SOA. Will continue to monitor.

## 2020-02-06 NOTE — NURSING NOTE
Discharge teaching completed with patient and spouse. Patient left the floor ambulating at 1439 to go home in personal vehicle with wife.

## 2020-02-06 NOTE — DISCHARGE SUMMARY
Date of Admission: 2/3/2020    Date of Discharge: 2/6/2020     PCP: Laureen Bernard APRN    Admission Diagnosis:   Please see admission H&P    Discharge Diagnosis:   Severe sepsis  Acute influenza A  Acute exacerbation of COPD  Acute hypoxic respiratory failure  Tobacco abuse  Hyperlipidemia     Procedures Performed: None     Consults:   Consults     No orders found from 1/5/2020 to 2/4/2020.            History of Present Illness:  Vincenzo Fagan is a 59 y.o. male who presented to [facility] ED with CC of shortness of breath. Please see admission H&P for complete details.     In the ED, workup revealed severe sepsis, acute influenza A, COPD exacerbation and acute hypoxic respiratory failure. Cultures were obtained. He was given steroids and nebs.        Hospital Course  Vincenzo Fagan was admitted to the PCU for further evaluation and treatment in the setting of his tachypnea, tachycardia and hypoxia. He was continued on Tamiflu, steroids, nebs and supplemental O2 in addition to maintenance IV fluids. He was counseled on the importance of smoking cessation and offered a nicotine patch but he declined the patch.     A CT chest PE protocol was obtained with no evidence of PE but did reveal likely mild central bronchial wall thickening possibly associated with active bronchitis. He quickly began showing some clinical improvement with the above mentioned treatment. His lactate normalized and IV fluids were stopped. He was later given a dose of Lasix to assist with lung compliance and diuresed well. He was transferred out of the PCU to the telemetry floor. His tachycardia improved and leukocytosis resolved. His CRP initially leonid slightly but then began improving and normalized on the day of discharge. Supplemental O2 was weaned as tolerated.     Mr. Fagan was seen and examined with CADY Rosado on 2/6/20. He remained hemodynamically stable with stable AM labs. He ambulated with nursing staff with his O2  remaining at 90% or greater on RA. He felt significantly improved from upon admission and was eager to go home. It was felt he had reached maximum benefit from his hospitalization and deemed medically stable for discharge. He was prescribed a course of Tamiflu in addition to a course of steroids. Instructions were given for follow up with his PCP in 1 week.       Condition on Discharge:  Stable    Vital Signs  Vitals:    02/06/20 0741   BP:    Pulse: 95   Resp: 18   Temp:    SpO2: 95%       Physical Exam:  General:    Awake, alert, in no acute distress   Heart:      Normal S1 and S2. Regular rate and rhythm. No significant murmur, rubs or gallops appreciated.   Lungs:     Respirations regular, even and unlabored. Lungs clear to auscultation B/L. No wheezes, rales or rhonchi.   Abdomen:   Soft and nontender. No guarding, rebound tenderness or  organomegaly noted. Bowel sounds present x 4.   Extremities:  No clubbing, cyanosis or edema noted. Moves UE and LE equally B/L.     Discharge Disposition:   home      Discharge Medications:     Discharge Medications      New Medications      Instructions Start Date   oseltamivir 75 MG capsule  Commonly known as:  TAMIFLU   75 mg, Oral, Every 12 Hours Scheduled         Changes to Medications      Instructions Start Date   predniSONE 20 MG tablet  Commonly known as:  DELTASONE  What changed:    · medication strength  · how much to take  · when to take this   40 mg, Oral, Daily With Breakfast         Continue These Medications      Instructions Start Date   albuterol sulfate  (90 Base) MCG/ACT inhaler  Commonly known as:  PROVENTIL HFA;VENTOLIN HFA;PROAIR HFA   2 puffs, Inhalation, Every 4 Hours PRN      atorvastatin 20 MG tablet  Commonly known as:  LIPITOR   20 mg, Oral, Nightly      benzonatate 200 MG capsule  Commonly known as:  TESSALON PERLES   200 mg, Oral, 3 Times Daily PRN      BREO ELLIPTA 200-25 MCG/INH inhaler  Generic drug:  Fluticasone Furoate-Vilanterol   1  puff, Inhalation, Nightly      buPROPion  MG 12 hr tablet  Commonly known as:  WELLBUTRIN SR   150 mg, Oral, 2 Times Daily      INCRUSE ELLIPTA 62.5 MCG/INH aerosol powder   Generic drug:  Umeclidinium Bromide   1 puff, Inhalation, Daily - RT      ipratropium-albuterol 0.5-2.5 mg/3 ml nebulizer  Commonly known as:  DUO-NEB   3 mL, Nebulization, 4 Times Daily PRN      montelukast 10 MG tablet  Commonly known as:  SINGULAIR   10 mg, Oral, Nightly      XOLAIR 150 MG injection  Generic drug:  omalizumab   150 mg, Subcutaneous, Every 14 Days               Discharge Diet:   Dietary Orders (From admission, onward)     Start     Ordered    02/04/20 0205  Diet Regular  Diet Effective Now     Question:  Diet Texture / Consistency  Answer:  Regular    02/04/20 0204                Activity at Discharge:  activity as tolerated    Follow-up Appointments:  Additional Instructions for the Follow-ups that You Need to Schedule     Discharge Follow-up with PCP   As directed       Currently Documented PCP:    Laureen Bernard APRN    PCP Phone Number:    [phone]     Follow Up Details:  Follow up with СЕРГЕЙ Ch in 1 week.           Follow-up Information     Laureen Bernard APRN .    Specialty:  Family Medicine  Why:  Follow up with СЕРГЕЙ Ch in 1 week.  Contact information:  [address]  [facility] [id number] [phone]                 Your Scheduled Appointments    Apr 23, 2020 10:45 AM EDT  Office Visit with СЕРГЕЙ Qureshi  [facility] (--) [address]  [facility] [place] [id number] [phone]   Arrive 15 minutes prior to appointment. If you are in need of a language or hearing  please call the Department.              Test Results Pending at Discharge:  None     Willian Montesinos DO  02/06/20  9:50 AM      Time: Greater than 30 minutes spent on this discharge.

## 2020-02-07 ENCOUNTER — TRANSITIONAL CARE MANAGEMENT TELEPHONE ENCOUNTER (OUTPATIENT)
Dept: FAMILY MEDICINE CLINIC | Facility: CLINIC | Age: 60
End: 2020-02-07

## 2020-02-07 NOTE — PAYOR COMM NOTE
"Contact: Mariza Sanchez RN @ [facility]  Phone: [phone]  Fax: [phone]    Auth# [id number]  Patient d/merissa home on 2/6/20              Jonathan Fagan (59 y.o. Male)     Date of Birth Social Security Number Address Home Phone MRN    1960  [address] [facility] [address] [phone] [id number]    Denominational Marital Status          None        Admission Date Admission Type Admitting Provider Attending Provider Department, Room/Bed    2/3/20 Emergency Ted Joyner MD  [facility] 3 [facility], 3323/1P    Discharge Date Discharge Disposition Discharge Destination        2/6/2020 Home or Self Care              Attending Provider:  (none)   Allergies:  No Known Allergies    Isolation:  Droplet   Infection:  Influenza (02/03/20)   Code Status:  Prior    Ht:  177.8 cm (70\")   Wt:  89 kg (196 lb 3.2 oz)    Admission Cmt:  None   Principal Problem:  None                Active Insurance as of 2/3/2020     Primary Coverage     Payor Plan Insurance Group Employer/Plan Group    [facility] [employer] EM[employer]EE [id number]     Payor Plan Address Payor Plan Phone Number Payor Plan Fax Number Effective Dates    [address] [phone]  8/16/2017 - None Entered    [place] [id number]       Subscriber Name Subscriber Birth Date Member ID       JONATHAN FAGAN 1960 [id number]                 Emergency Contacts      (Rel.) Home Phone Work Phone Mobile Phone    Sultana Fagan (Spouse) [phone] -- --              "

## 2020-02-07 NOTE — OUTREACH NOTE
TCM completed and patient rescheduled appointment from 02/11/2020 to 02/12/2020 because he needed a earlier appointment time.

## 2020-02-08 LAB
BACTERIA SPEC AEROBE CULT: NORMAL
BACTERIA SPEC AEROBE CULT: NORMAL

## 2020-02-12 ENCOUNTER — OFFICE VISIT (OUTPATIENT)
Dept: FAMILY MEDICINE CLINIC | Facility: CLINIC | Age: 60
End: 2020-02-12

## 2020-02-12 VITALS
HEART RATE: 99 BPM | TEMPERATURE: 98.3 F | SYSTOLIC BLOOD PRESSURE: 123 MMHG | WEIGHT: 201 LBS | HEIGHT: 70 IN | DIASTOLIC BLOOD PRESSURE: 84 MMHG | BODY MASS INDEX: 28.77 KG/M2 | OXYGEN SATURATION: 98 %

## 2020-02-12 DIAGNOSIS — J44.9 CHRONIC OBSTRUCTIVE PULMONARY DISEASE, UNSPECIFIED COPD TYPE (HCC): ICD-10-CM

## 2020-02-12 DIAGNOSIS — J96.01 ACUTE RESPIRATORY FAILURE WITH HYPOXIA (HCC): Primary | ICD-10-CM

## 2020-02-12 PROCEDURE — 99495 TRANSJ CARE MGMT MOD F2F 14D: CPT | Performed by: NURSE PRACTITIONER

## 2020-02-12 NOTE — PROGRESS NOTES
Subjective   Vincenzo Fagan is a 59 y.o. male.     Chief Complaint: Transitional Care Management    History of Present Illness   Pt is here today for transitional care visit.  He was admitted to [facility] on 2/3/2020 and discharged home on 2/6/2020.  The following notes were reviewed from his discharge summary from [facility]:  Vincenzo Fagan was admitted to the PCU for further evaluation and treatment in the setting of his tachypnea, tachycardia and hypoxia. He was continued on Tamiflu, steroids, nebs and supplemental O2 in addition to maintenance IV fluids. He was counseled on the importance of smoking cessation and offered a nicotine patch but he declined the patch.   A CT chest PE protocol was obtained with no evidence of PE but did reveal likely mild central bronchial wall thickening possibly associated with active bronchitis. He quickly began showing some clinical improvement with the above mentioned treatment. His lactate normalized and IV fluids were stopped. He was later given a dose of Lasix to assist with lung compliance and diuresed well. He was transferred out of the PCU to the telemetry floor. His tachycardia improved and leukocytosis resolved. His CRP initially leonid slightly but then began improving and normalized on the day of discharge. Supplemental O2 was weaned as tolerated.   Mr. Fagan was seen and examined with CADY Rosado on 2/6/20. He remained hemodynamically stable with stable AM labs. He ambulated with nursing staff with his O2 remaining at 90% or greater on RA. He felt significantly improved from upon admission and was eager to go home. It was felt he had reached maximum benefit from his hospitalization and deemed medically stable for discharge. He was prescribed a course of Tamiflu in addition to a course of steroids. Instructions were given for follow up with his PCP in 1 week.     Pt states that he has completed the medication that were prescribed at discharge.  He is breathing much  better.  He does continue to have a slight cough but only occasional.   He is back to doing his normal activities and tolerating them well.  He is taking his medication as prescribed.     Family History   Problem Relation Age of Onset   • COPD Mother    • Diabetes Mother    • Hyperlipidemia Mother    • Lung cancer Father    • Cancer Father    • Lung cancer Sister        Social History     Socioeconomic History   • Marital status:      Spouse name: Not on file   • Number of children: Not on file   • Years of education: Not on file   • Highest education level: Not on file   Tobacco Use   • Smoking status: Former Smoker     Packs/day: 0.50     Years: 0.50     Pack years: 0.25     Types: Cigarettes, Electronic Cigarette     Start date: 8/4/2019   • Smokeless tobacco: Never Used   • Tobacco comment: patient stated he has not smoked in 1 week    Substance and Sexual Activity   • Alcohol use: Never     Alcohol/week: 2.0 standard drinks     Types: 2 Cans of beer per week     Frequency: Never     Comment: Social/ with a meal    • Drug use: Never   • Sexual activity: Yes     Partners: Female       Past Medical History:   Diagnosis Date   • Asthma    • COPD (chronic obstructive pulmonary disease) (CMS/[facility])    • Elevated cholesterol    • GERD (gastroesophageal reflux disease)    • Hyperlipidemia    • Multiple allergies        Review of Systems   Constitutional: Negative.    HENT: Negative.    Respiratory: Negative.    Cardiovascular: Negative.    Gastrointestinal: Negative.    Musculoskeletal: Negative.    Skin: Negative.    Neurological: Negative.    Psychiatric/Behavioral: Negative.        Objective   Physical Exam   Constitutional: He is oriented to person, place, and time. He appears well-developed and well-nourished.   Neck: Normal range of motion. Neck supple.   Cardiovascular: Normal rate, regular rhythm and normal heart sounds.   Pulmonary/Chest: Effort normal and breath sounds normal.   Neurological: He is alert  "and oriented to person, place, and time.   Skin: Skin is warm and dry.   Psychiatric: He has a normal mood and affect. His behavior is normal. Judgment and thought content normal.   Nursing note and vitals reviewed.      Procedures    Vitals: Blood pressure 123/84, pulse 99, temperature 98.3 °F (36.8 °C), temperature source Oral, height 177.8 cm (70\"), weight 91.2 kg (201 lb), SpO2 98 %.    Allergies: No Known Allergies     During this visit the following were done:  Labs Reviewed []    Labs Ordered []    Radiology Reports Reviewed []    Radiology Ordered []    PCP Records Reviewed []    Referring Provider Records Reviewed []    ER Records Reviewed []    Hospital Records Reviewed []    History Obtained From Family []    Radiology Images Reviewed []    Other Reviewed []    Records Requested []      Assessment/Plan   Vincenzo was seen today for transitional care management.    Diagnoses and all orders for this visit:    Acute respiratory failure with hypoxia (CMS/HCC)   Doing much better.  Breathing well.    Chronic obstructive pulmonary disease, unspecified COPD type (CMS/HCC)   Continue current medicaitons   Stable.           "

## 2020-04-28 ENCOUNTER — OFFICE VISIT (OUTPATIENT)
Dept: FAMILY MEDICINE CLINIC | Facility: CLINIC | Age: 60
End: 2020-04-28

## 2020-04-28 DIAGNOSIS — Z72.0 TOBACCO USE: ICD-10-CM

## 2020-04-28 DIAGNOSIS — J44.9 CHRONIC OBSTRUCTIVE PULMONARY DISEASE, UNSPECIFIED COPD TYPE (HCC): Primary | ICD-10-CM

## 2020-04-28 PROCEDURE — 99442 PR PHYS/QHP TELEPHONE EVALUATION 11-20 MIN: CPT | Performed by: NURSE PRACTITIONER

## 2020-04-28 NOTE — PROGRESS NOTES
You have chosen to receive care through a telephone visit. Do you consent to use a telephone visit for your medical care today? Yes YES

## 2020-04-28 NOTE — PROGRESS NOTES
Subjective   Vincenzo Fagan is a 59 y.o. male.     Chief Complaint: COPD    COPD   He complains of cough and wheezing. This is a chronic problem. The current episode started more than 1 year ago. The problem occurs every several days. The problem has been waxing and waning. The cough is non-productive. His symptoms are aggravated by exposure to smoke and pollen. His symptoms are alleviated by steroid inhaler. He reports significant improvement on treatment. Risk factors for lung disease include smoking/tobacco exposure. His past medical history is significant for COPD.   Nicotine Dependence   Presents for follow-up visit. His urge triggers include company of smokers. The symptoms have been improving. He smokes < 1/2 a pack (pt states that he may go several days before having a cigarette) of cigarettes per day.        Family History   Problem Relation Age of Onset   • COPD Mother    • Diabetes Mother    • Hyperlipidemia Mother    • Lung cancer Father    • Cancer Father    • Lung cancer Sister        Social History     Socioeconomic History   • Marital status:      Spouse name: Not on file   • Number of children: Not on file   • Years of education: Not on file   • Highest education level: Not on file   Tobacco Use   • Smoking status: Former Smoker     Packs/day: 0.50     Years: 0.50     Pack years: 0.25     Types: Cigarettes, Electronic Cigarette     Start date: 8/4/2019   • Smokeless tobacco: Never Used   • Tobacco comment: patient stated he has not smoked in 1 week    Substance and Sexual Activity   • Alcohol use: Never     Alcohol/week: 2.0 standard drinks     Types: 2 Cans of beer per week     Frequency: Never     Comment: Social/ with a meal    • Drug use: Never   • Sexual activity: Yes     Partners: Female       Past Medical History:   Diagnosis Date   • Asthma    • COPD (chronic obstructive pulmonary disease) (CMS/[facility])    • Elevated cholesterol    • GERD (gastroesophageal reflux disease)    •  Hyperlipidemia    • Multiple allergies        Review of Systems   Constitutional: Negative.    HENT: Negative.    Respiratory: Positive for cough and wheezing.    Cardiovascular: Negative.    Gastrointestinal: Negative.    Musculoskeletal: Negative.    Skin: Negative.    Neurological: Negative.    Psychiatric/Behavioral: Negative.        Objective   Physical Exam   Constitutional: He is oriented to person, place, and time.   Neurological: He is alert and oriented to person, place, and time.   Psychiatric: Thought content normal.       Procedures    Vitals: There were no vitals taken for this visit.    There is no height or weight on file to calculate BMI.     Allergies: No Known Allergies     During this visit the following were done:  Labs Reviewed []    Labs Ordered []    Radiology Reports Reviewed []    Radiology Ordered []    PCP Records Reviewed []    Referring Provider Records Reviewed []    ER Records Reviewed []    Hospital Records Reviewed []    History Obtained From Family []    Radiology Images Reviewed []    Other Reviewed []    Records Requested []      Assessment/Plan   Vincenzo was seen today for copd.    Diagnoses and all orders for this visit:    Chronic obstructive pulmonary disease, unspecified COPD type (CMS/[facility])   Stable.   Continue current medications  Tobacco use   Discussed risks of tobacco abuse with patient.    You have chosen to receive care through a telephone visit. Do you consent to use a telephone visit for your medical care today? Yes    Length of visit:  12 minutes

## 2020-07-20 DIAGNOSIS — Z72.0 TOBACCO USE: ICD-10-CM

## 2020-07-21 RX ORDER — BUPROPION HYDROCHLORIDE 150 MG/1
TABLET, EXTENDED RELEASE ORAL
Qty: 60 TABLET | Refills: 0 | Status: SHIPPED | OUTPATIENT
Start: 2020-07-21 | End: 2020-10-13

## 2020-10-12 DIAGNOSIS — Z72.0 TOBACCO USE: ICD-10-CM

## 2020-10-13 RX ORDER — BUPROPION HYDROCHLORIDE 150 MG/1
TABLET, EXTENDED RELEASE ORAL
Qty: 60 TABLET | Refills: 0 | Status: SHIPPED | OUTPATIENT
Start: 2020-10-13 | End: 2020-12-29

## 2020-10-13 RX ORDER — ATORVASTATIN CALCIUM 20 MG/1
TABLET, FILM COATED ORAL
Qty: 30 TABLET | Refills: 1 | Status: SHIPPED | OUTPATIENT
Start: 2020-10-13

## 2020-10-14 DIAGNOSIS — Z72.0 TOBACCO USE: ICD-10-CM

## 2020-10-14 RX ORDER — ATORVASTATIN CALCIUM 20 MG/1
20 TABLET, FILM COATED ORAL
Qty: 30 TABLET | Refills: 1 | OUTPATIENT
Start: 2020-10-14

## 2020-10-14 RX ORDER — BUPROPION HYDROCHLORIDE 150 MG/1
TABLET, EXTENDED RELEASE ORAL
Qty: 60 TABLET | Refills: 0 | OUTPATIENT
Start: 2020-10-14

## 2020-12-28 DIAGNOSIS — Z72.0 TOBACCO USE: ICD-10-CM

## 2020-12-29 RX ORDER — BUPROPION HYDROCHLORIDE 150 MG/1
TABLET, EXTENDED RELEASE ORAL
Qty: 60 TABLET | Refills: 0 | Status: SHIPPED | OUTPATIENT
Start: 2020-12-29

## 2021-01-23 DIAGNOSIS — Z72.0 TOBACCO USE: ICD-10-CM

## 2021-01-26 RX ORDER — ATORVASTATIN CALCIUM 20 MG/1
TABLET, FILM COATED ORAL
Qty: 30 TABLET | Refills: 1 | OUTPATIENT
Start: 2021-01-26

## 2021-01-26 RX ORDER — BUPROPION HYDROCHLORIDE 150 MG/1
TABLET, EXTENDED RELEASE ORAL
Qty: 60 TABLET | Refills: 0 | OUTPATIENT
Start: 2021-01-26